# Patient Record
Sex: FEMALE | Race: WHITE | NOT HISPANIC OR LATINO | Employment: OTHER | URBAN - METROPOLITAN AREA
[De-identification: names, ages, dates, MRNs, and addresses within clinical notes are randomized per-mention and may not be internally consistent; named-entity substitution may affect disease eponyms.]

---

## 2023-10-25 ENCOUNTER — OFFICE VISIT (OUTPATIENT)
Dept: ORTHOPEDIC SURGERY | Facility: CLINIC | Age: 73
End: 2023-10-25
Payer: MEDICARE

## 2023-10-25 ENCOUNTER — ANCILLARY PROCEDURE (OUTPATIENT)
Dept: RADIOLOGY | Facility: CLINIC | Age: 73
End: 2023-10-25
Payer: MEDICARE

## 2023-10-25 DIAGNOSIS — M48.061 DEGENERATIVE LUMBAR SPINAL STENOSIS: Primary | ICD-10-CM

## 2023-10-25 DIAGNOSIS — G89.29 CHRONIC LOW BACK PAIN, UNSPECIFIED BACK PAIN LATERALITY, UNSPECIFIED WHETHER SCIATICA PRESENT: ICD-10-CM

## 2023-10-25 DIAGNOSIS — M54.50 CHRONIC LOW BACK PAIN, UNSPECIFIED BACK PAIN LATERALITY, UNSPECIFIED WHETHER SCIATICA PRESENT: ICD-10-CM

## 2023-10-25 PROCEDURE — 99205 OFFICE O/P NEW HI 60 MIN: CPT | Performed by: ORTHOPAEDIC SURGERY

## 2023-10-25 PROCEDURE — 72110 X-RAY EXAM L-2 SPINE 4/>VWS: CPT | Performed by: RADIOLOGY

## 2023-10-25 PROCEDURE — 72110 X-RAY EXAM L-2 SPINE 4/>VWS: CPT

## 2023-10-26 NOTE — PROGRESS NOTES
This 72-year-old woman lives in [facility].  She is self-referred.  She is here with her significant other who has ties to [place].    She has had longstanding issues with her low back.    She has had 2 prior surgeries.  Most recently was a spine fusion at the L3-4 level.    She describes chronic longstanding low back and bilateral buttock pain.  Her ability to walk distances is very compromised.  Her symptoms are consistent with neurogenic claudication.    She has had prior physical therapy.  She has had epidural injections.    She is a diabetic.  She is not certain what her hemoglobin A1c is.    No significant cardiac disease.  She does have thyroid disease.    She does not smoke.    Family, social, and medical histories are obtained and reviewed.    30-point, patient-recorded Review of Systems is personally obtained and reviewed. Inclusive is no history of weight loss, change in appetite, recent change in activity level, change in bowel or bladder habits, fevers, chills, malaise, or night pain.    On exam very pleasant woman no acute distress.  Thin stature.  Healed, long midline lumbar scar.  Flexes to 45 degrees.  Painless motion both hips.    Her strength is intact in the lower extremities without pathologic reflexes.    Plain films are obtained today.  She has pedicle screw instrumentation at L3-4.  There is a healed interbody fusion at L3-4.  She also has a healed interbody fusion at L4-5.  She does have a degenerative L5-S1 disc space.  She has retrolisthesis of L2 on 3 above her prior fusion.    Her MRI, which we uploaded to PACS, does show significant stenosis at L2-3, above her prior fusion.    Impression: We had a very lengthy discussion about her situation today, greater than 50 minutes.    She is symptomatic with lumbar radiculopathy.  The degree of stenosis that she has at L2-3 would warrant discussion of surgery.    Likely, but surgical approach would include a lateral interbody fusion at  L2-3 followed by a revision posterior fusion with instrumentation extending her to L2.    I would make certain that she is exhausted nonsurgical management.  If she has not done physical therapy within the last 6 months, I would strongly recommend a dedicated course of physical therapy to include documentation of the physical therapy that she has done.    Ultimately if she were to return to [place] for surgery, it would be necessary for her to remain here for roughly 4 weeks after surgery before returning home.    We talked at length about the risks and benefits and expectations of surgery.  We talked about the options and alternatives.    I think epidural steroid injections would be of limited benefit, given the severity of her stenosis.    She will consider things and she will let us know if she would like to proceed.    Again, I have stressed the importance of documenting any physical therapy that she may have done in the last 6 months.    ** Dictated with voice recognition software and not immediately reviewed for errors in grammar and/or spelling **

## 2024-04-22 ENCOUNTER — TELEMEDICINE (OUTPATIENT)
Dept: ORTHOPEDIC SURGERY | Facility: CLINIC | Age: 74
End: 2024-04-22
Payer: MEDICARE

## 2024-04-22 DIAGNOSIS — M48.061 DEGENERATIVE LUMBAR SPINAL STENOSIS: Primary | ICD-10-CM

## 2024-04-22 NOTE — PROGRESS NOTES
I had a virtual visit today with Chandni.    She is as noted, she lives in [place].  She and her significant other are originally from [place] and they have a condominium in [place].    She continues to be quite symptomatic with severe back and bilateral leg pain, consistent with neurogenic claudication.    She has had 2 prior surgeries, most recently an L3-4 instrumented fusion.    Imaging reveals adjacent level disease at L2-3 with severe stenosis and retrolisthesis.    She has continued to do regular physical therapy.    She recently had an executive physical that included blood work and she believes a hemoglobin A1c was also obtained.  She is a diabetic.    She would like to proceed with surgery which is an option if her A1c level is appropriate.  She will send these results to me.    Surgery in her case would be a lateral interbody fusion at L4-3 followed by a revision posterior decompression and fusion L2-L3.  This would require removing her prior instrumentation and examining her fusion mass.    We have discussed the risks and benefits and expectations of surgery.      She indicates she would like to consider surgery in July so generally that would mean returning to [place] for preadmission testing roughly 2 weeks in advance and I would suggest staying in town for 4 weeks following surgery before returning home.    ** Dictated with voice recognition software and not immediately reviewed for errors in grammar and/or spelling **

## 2024-06-27 PROBLEM — M48.062 SPINAL STENOSIS, LUMBAR REGION WITH NEUROGENIC CLAUDICATION: Status: ACTIVE | Noted: 2024-06-27

## 2024-07-16 ENCOUNTER — CLINICAL SUPPORT (OUTPATIENT)
Dept: PREADMISSION TESTING | Facility: HOSPITAL | Age: 74
End: 2024-07-16
Payer: MEDICARE

## 2024-07-16 DIAGNOSIS — M48.062 SPINAL STENOSIS, LUMBAR REGION WITH NEUROGENIC CLAUDICATION: ICD-10-CM

## 2024-07-16 RX ORDER — ZOLPIDEM TARTRATE 10 MG/1
TABLET ORAL NIGHTLY PRN
COMMUNITY

## 2024-07-16 RX ORDER — GABAPENTIN 300 MG/1
300 CAPSULE ORAL 2 TIMES DAILY
COMMUNITY

## 2024-07-16 RX ORDER — UBIDECARENONE 30 MG
2 CAPSULE ORAL DAILY
COMMUNITY

## 2024-07-16 RX ORDER — ASPIRIN 81 MG/1
81 TABLET ORAL DAILY
COMMUNITY

## 2024-07-16 RX ORDER — OMEGA-3-ACID ETHYL ESTERS 1 G/1
1 CAPSULE, LIQUID FILLED ORAL DAILY
COMMUNITY

## 2024-07-16 RX ORDER — ACETAMINOPHEN 500 MG
TABLET ORAL DAILY
COMMUNITY

## 2024-07-16 RX ORDER — LAMOTRIGINE 200 MG/1
TABLET ORAL DAILY
COMMUNITY

## 2024-07-16 RX ORDER — ATORVASTATIN CALCIUM 40 MG/1
40 TABLET, FILM COATED ORAL DAILY
COMMUNITY

## 2024-07-16 RX ORDER — LANOLIN ALCOHOL/MO/W.PET/CERES
100 CREAM (GRAM) TOPICAL DAILY
COMMUNITY

## 2024-07-16 RX ORDER — IBUPROFEN 800 MG/1
800 TABLET ORAL EVERY 8 HOURS PRN
COMMUNITY

## 2024-07-16 RX ORDER — IRBESARTAN AND HYDROCHLOROTHIAZIDE 300; 12.5 MG/1; MG/1
1 TABLET, FILM COATED ORAL DAILY
COMMUNITY

## 2024-07-16 RX ORDER — METFORMIN HYDROCHLORIDE 500 MG/1
1000 TABLET ORAL
COMMUNITY

## 2024-07-16 RX ORDER — PROGESTERONE 100 MG/1
50 CAPSULE ORAL DAILY
COMMUNITY

## 2024-07-16 RX ORDER — LEVOTHYROXINE SODIUM 100 UG/1
100 TABLET ORAL DAILY
COMMUNITY

## 2024-07-23 ENCOUNTER — LAB (OUTPATIENT)
Dept: LAB | Facility: LAB | Age: 74
End: 2024-07-23
Payer: MEDICARE

## 2024-07-23 ENCOUNTER — TELEPHONE (OUTPATIENT)
Dept: PREADMISSION TESTING | Facility: HOSPITAL | Age: 74
End: 2024-07-23

## 2024-07-23 ENCOUNTER — PRE-ADMISSION TESTING (OUTPATIENT)
Dept: PREADMISSION TESTING | Facility: HOSPITAL | Age: 74
End: 2024-07-23
Payer: MEDICARE

## 2024-07-23 VITALS
OXYGEN SATURATION: 100 % | SYSTOLIC BLOOD PRESSURE: 153 MMHG | RESPIRATION RATE: 16 BRPM | HEIGHT: 63 IN | WEIGHT: 129.63 LBS | TEMPERATURE: 97.5 F | DIASTOLIC BLOOD PRESSURE: 70 MMHG | BODY MASS INDEX: 22.97 KG/M2 | HEART RATE: 66 BPM

## 2024-07-23 DIAGNOSIS — R06.02 SOB (SHORTNESS OF BREATH): ICD-10-CM

## 2024-07-23 DIAGNOSIS — Z01.818 PRE-OP TESTING: ICD-10-CM

## 2024-07-23 DIAGNOSIS — R73.9 ELEVATED BLOOD SUGAR: ICD-10-CM

## 2024-07-23 DIAGNOSIS — Z01.818 PRE-OP TESTING: Primary | ICD-10-CM

## 2024-07-23 LAB
ALBUMIN SERPL BCP-MCNC: 4.3 G/DL (ref 3.4–5)
ALP SERPL-CCNC: 52 U/L (ref 33–136)
ALT SERPL W P-5'-P-CCNC: 18 U/L (ref 7–45)
ANION GAP SERPL CALC-SCNC: 14 MMOL/L (ref 10–20)
APPEARANCE UR: CLEAR
APTT PPP: 28 SECONDS (ref 27–38)
AST SERPL W P-5'-P-CCNC: 20 U/L (ref 9–39)
ATRIAL RATE: 64 BPM
BASOPHILS # BLD AUTO: 0.04 X10*3/UL (ref 0–0.1)
BASOPHILS NFR BLD AUTO: 0.5 %
BILIRUB SERPL-MCNC: 0.5 MG/DL (ref 0–1.2)
BILIRUB UR STRIP.AUTO-MCNC: NEGATIVE MG/DL
BUN SERPL-MCNC: 23 MG/DL (ref 6–23)
CALCIUM SERPL-MCNC: 9.6 MG/DL (ref 8.6–10.3)
CHLORIDE SERPL-SCNC: 99 MMOL/L (ref 98–107)
CO2 SERPL-SCNC: 28 MMOL/L (ref 21–32)
COLOR UR: YELLOW
CREAT SERPL-MCNC: 0.98 MG/DL (ref 0.5–1.05)
EGFRCR SERPLBLD CKD-EPI 2021: 60 ML/MIN/1.73M*2
EOSINOPHIL # BLD AUTO: 0.15 X10*3/UL (ref 0–0.4)
EOSINOPHIL NFR BLD AUTO: 2 %
ERYTHROCYTE [DISTWIDTH] IN BLOOD BY AUTOMATED COUNT: 12 % (ref 11.5–14.5)
EST. AVERAGE GLUCOSE BLD GHB EST-MCNC: 134 MG/DL
GLUCOSE SERPL-MCNC: 90 MG/DL (ref 74–99)
GLUCOSE UR STRIP.AUTO-MCNC: NORMAL MG/DL
HBA1C MFR BLD: 6.3 %
HCT VFR BLD AUTO: 36 % (ref 36–46)
HGB BLD-MCNC: 11.7 G/DL (ref 12–16)
HOLD SPECIMEN: NORMAL
IMM GRANULOCYTES # BLD AUTO: 0.02 X10*3/UL (ref 0–0.5)
IMM GRANULOCYTES NFR BLD AUTO: 0.3 % (ref 0–0.9)
INR PPP: 1 (ref 0.9–1.1)
KETONES UR STRIP.AUTO-MCNC: NEGATIVE MG/DL
LEUKOCYTE ESTERASE UR QL STRIP.AUTO: NEGATIVE
LYMPHOCYTES # BLD AUTO: 1.29 X10*3/UL (ref 0.8–3)
LYMPHOCYTES NFR BLD AUTO: 17.1 %
MCH RBC QN AUTO: 30.5 PG (ref 26–34)
MCHC RBC AUTO-ENTMCNC: 32.5 G/DL (ref 32–36)
MCV RBC AUTO: 94 FL (ref 80–100)
MONOCYTES # BLD AUTO: 0.53 X10*3/UL (ref 0.05–0.8)
MONOCYTES NFR BLD AUTO: 7 %
NEUTROPHILS # BLD AUTO: 5.53 X10*3/UL (ref 1.6–5.5)
NEUTROPHILS NFR BLD AUTO: 73.1 %
NITRITE UR QL STRIP.AUTO: NEGATIVE
NRBC BLD-RTO: 0 /100 WBCS (ref 0–0)
P AXIS: 12 DEGREES
P OFFSET: 184 MS
P ONSET: 154 MS
PH UR STRIP.AUTO: 5.5 [PH]
PLATELET # BLD AUTO: 328 X10*3/UL (ref 150–450)
POTASSIUM SERPL-SCNC: 3.6 MMOL/L (ref 3.5–5.3)
PR INTERVAL: 152 MS
PROT SERPL-MCNC: 6.7 G/DL (ref 6.4–8.2)
PROT UR STRIP.AUTO-MCNC: NEGATIVE MG/DL
PROTHROMBIN TIME: 11.2 SECONDS (ref 9.8–12.8)
Q ONSET: 230 MS
QRS COUNT: 10 BEATS
QRS DURATION: 146 MS
QT INTERVAL: 466 MS
QTC CALCULATION(BAZETT): 480 MS
QTC FREDERICIA: 476 MS
R AXIS: -51 DEGREES
RBC # BLD AUTO: 3.83 X10*6/UL (ref 4–5.2)
RBC # UR STRIP.AUTO: NEGATIVE /UL
SODIUM SERPL-SCNC: 137 MMOL/L (ref 136–145)
SP GR UR STRIP.AUTO: 1.02
T AXIS: -12 DEGREES
T OFFSET: 463 MS
UROBILINOGEN UR STRIP.AUTO-MCNC: NORMAL MG/DL
VENTRICULAR RATE: 64 BPM
WBC # BLD AUTO: 7.6 X10*3/UL (ref 4.4–11.3)

## 2024-07-23 PROCEDURE — 80053 COMPREHEN METABOLIC PANEL: CPT

## 2024-07-23 PROCEDURE — 85730 THROMBOPLASTIN TIME PARTIAL: CPT

## 2024-07-23 PROCEDURE — 83036 HEMOGLOBIN GLYCOSYLATED A1C: CPT

## 2024-07-23 PROCEDURE — 87081 CULTURE SCREEN ONLY: CPT | Mod: AHULAB | Performed by: ORTHOPAEDIC SURGERY

## 2024-07-23 PROCEDURE — 85610 PROTHROMBIN TIME: CPT

## 2024-07-23 PROCEDURE — 81003 URINALYSIS AUTO W/O SCOPE: CPT

## 2024-07-23 PROCEDURE — 93005 ELECTROCARDIOGRAM TRACING: CPT | Performed by: NURSE PRACTITIONER

## 2024-07-23 PROCEDURE — 85025 COMPLETE CBC W/AUTO DIFF WBC: CPT

## 2024-07-23 PROCEDURE — 36415 COLL VENOUS BLD VENIPUNCTURE: CPT

## 2024-07-23 RX ORDER — TRAMADOL HYDROCHLORIDE 50 MG/1
TABLET ORAL EVERY 6 HOURS PRN
COMMUNITY

## 2024-07-23 RX ORDER — CHLORHEXIDINE GLUCONATE ORAL RINSE 1.2 MG/ML
SOLUTION DENTAL
Qty: 475 ML | Refills: 0 | Status: SHIPPED | OUTPATIENT
Start: 2024-07-23

## 2024-07-23 ASSESSMENT — ENCOUNTER SYMPTOMS
DIARRHEA: 0
NUMBNESS: 1
NECK NEGATIVE: 1
LIMITED RANGE OF MOTION: 1
BRUISES/BLEEDS EASILY: 1
RESPIRATORY NEGATIVE: 1
DYSPNEA WITH EXERTION: 0
CONSTITUTIONAL NEGATIVE: 1
PALPITATIONS: 0
ABDOMINAL PAIN: 0
DYSPNEA AT REST: 0
CONSTIPATION: 0

## 2024-07-23 NOTE — CPM/PAT H&P
[facility] Evaluation       Name: Chandni Crawford (Chandni Crawford)  /Age: 1950/73 y.o.         Date of Consult: 24     Referring Provider: Dr. KRISTIN Evangelista    Surgery, Date, and Length:     L2-L3 Lateral Discectomy and Fusion; L2-L4 Posterior Decompression and Fusion; Removal of Segmental Instrumentation; Exploration of Fusion Mass; Navigation, 24, 240 min       Chandni Crawford is a 73 year-old female who presents to the [facility] for perioperative risk assessment prior to surgery.  Patient currently live in [place]. She and her significant other are originally from [place] and they have a condominium in [place].   She has had 2 prior surgeries, most recently an L3-4 instrumented fusion.     Imaging reveals adjacent level disease at L2-3 with severe stenosis and retrolisthesis.     She has continued to do regular physical therapy.    Patient presents with a primary diagnosis of Spinal stenosis, lumbar region with neurogenic claudication .     This note was created in part upon personal review of patient's medical records.      Patient is scheduled to have a L2-L3 Lateral Discectomy and Fusion; L2-L4 Posterior Decompression and Fusion; Removal of Segmental Instrumentation; Exploration of Fusion Mass; Navigation.      Pt denies any past history of anesthetic complications such as PONV, awareness, prolonged sedation, dental damage, aspiration, cardiac arrest, difficult intubation, difficult I.V. access or unexpected hospital admissions.  NO malignant hyperthermia and or pseudocholinesterase deficiency.  No history of blood transfusions     The patient is not a Protestant and will accept blood and blood products if medically indicated.   Type and screen sent.     Past Medical History:   Diagnosis Date    Buttock pain     bilateral    CAD (coronary artery disease)     followed by cardiologist Dr. Uday saenz in [place], task made for notes, testing and per patient she was cleared from a  "cardiac standpoint    Chronic low back pain     Depression with anxiety     per patient\"thats why I'm on Lamictal\"    DM (diabetes mellitus) (Multi)     no recent A1c, task made for PCP note and recent labs from Dr. Jalil Mckinney in [place]    HLD (hyperlipidemia)     HPV in female     HTN (hypertension)     Hypothyroidism     Neuropathy     per patient constant tingling in both feet; Right leg is painful to touch and tingling    OA (osteoarthritis)     Post-laminectomy syndrome     Ringing in ears, bilateral     constant    Short-term memory loss     Spinal stenosis of lumbar region with neurogenic claudication     Spondylolisthesis        Past Surgical History:   Procedure Laterality Date    BREAST SURGERY      reductionx2    COSMETIC SURGERY      eye lids    DILATION AND CURETTAGE OF UTERUS      GASTRIC BYPASS      HAND SURGERY Bilateral     trigger finger repair, multiple times    HYSTERECTOMY      KNEE SURGERY Right     meniscus repair    LUMBAR LAMINECTOMY      RHINOPLASTY      ROTATOR CUFF REPAIR Bilateral     SPINAL FUSION         Social History     Socioeconomic History    Marital status: Single   Tobacco Use    Smoking status: Never    Smokeless tobacco: Never   Vaping Use    Vaping status: Never Used   Substance and Sexual Activity    Alcohol use: Not Currently    Drug use: Never    Sexual activity: Defer        Family History   Problem Relation Name Age of Onset    Breast cancer Mother      Heart disease Father      Crohn's disease Father      Diabetes Brother         No Known Allergies      Current Outpatient Medications:     5-hydroxytryptophan, 5-HTP, (5-HTP ORAL), Take 100 mg by mouth once daily., Disp: , Rfl:     ascorbic acid/collagen hydr (COLLAGEN SKIN RENEWAL ORAL), Take 1 Dose by mouth 2 times a day., Disp: , Rfl:     aspirin 81 mg EC tablet, Take 1 tablet (81 mg) by mouth once daily., Disp: , Rfl:     atorvastatin (Lipitor) 40 mg tablet, Take 1 tablet (40 mg) by mouth once daily., Disp: , " Rfl:     BIOTIN ORAL, Take 1,000 mg by mouth once daily., Disp: , Rfl:     cholecalciferol (Vitamin D3) 5,000 Units tablet, Take by mouth once daily., Disp: , Rfl:     co-enzyme Q-10 30 mg capsule, Take 2 capsules (60 mg) by mouth once daily., Disp: , Rfl:     dulaglutide (TRULICITY SUBQ), Inject under the skin., Disp: , Rfl:     estradiol cypionate (Depo-estradiol) 5 mg/mL injection, Inject 4 mL (20 mg) into the muscle every 14 (fourteen) days., Disp: , Rfl:     gabapentin (Neurontin) 300 mg capsule, Take 1 capsule (300 mg) by mouth 2 times a day., Disp: , Rfl:     ibuprofen 800 mg tablet, Take 1 tablet (800 mg) by mouth every 8 hours if needed for mild pain (1 - 3)., Disp: , Rfl:     irbesartan-hydrochlorothiazide (Avalide) 300-12.5 mg tablet, Take 1 tablet by mouth once daily., Disp: , Rfl:     IRON GLYCINATE,POLYSACCH CMPLX ORAL, Take by mouth once daily., Disp: , Rfl:     lamoTRIgine (LaMICtal) 200 mg tablet, Take by mouth once daily., Disp: , Rfl:     levothyroxine (Synthroid, Levoxyl) 100 mcg tablet, Take 1 tablet (100 mcg) by mouth early in the morning.. Take on an empty stomach at the same time each day, either 30 to 60 minutes prior to breakfast, Disp: , Rfl:     metFORMIN (Glucophage) 500 mg tablet, Take 2 tablets (1,000 mg) by mouth 2 times daily (morning and late afternoon)., Disp: , Rfl:     omega-3 acid ethyl esters (Lovaza) 1 gram capsule, Take 1 capsule (1 g) by mouth once daily., Disp: , Rfl:     progesterone (Prometrium) 100 mg capsule, Take 50 mg by mouth once daily., Disp: , Rfl:     thiamine 100 mg tablet, Take 1 tablet (100 mg) by mouth once daily., Disp: , Rfl:     traMADol (Ultram) 50 mg tablet, Take by mouth every 6 hours if needed for severe pain (7 - 10)., Disp: , Rfl:     TURMERIC ORAL, Take by mouth once daily., Disp: , Rfl:     zolpidem (Ambien) 10 mg tablet, Take by mouth as needed at bedtime for sleep., Disp: , Rfl:     chlorhexidine (Peridex) 0.12 % solution, Swish for 30 seconds  "and spit 15mL of solution the night before and morning of surgery, Disp: 475 mL, Rfl: 0      PAT ROS:   Constitutional:   neg    Neuro/Psych:    Numbness and tingling down the right leg   numbness  Eyes:    use of corrective lenses  Ears:    no hearing aides  Nose:   Mouth:   neg    Throat:   neg    Neck:   neg    Cardio:    Functional 4 Mets. Patient denies SOB walking up 2 flights of stairs    no chest pain   no palpitations   no dyspnea   no ANN  Respiratory:   neg    Endocrine:   GI:    no abdominal pain   no constipation   no diarrhea  :   neg    Musculoskeletal:    Stabbing, throbbing pain in right hip   decreased ROM  Hematologic:    bruises/bleeds easily   no history of blood transfusion   no blood clots  Skin:  neg        Physical Exam  Physical exam within normal limits.   Musculoskeletal:      Comments: Limited rom due to back pain and leg pain          PAT AIRWAY:   Airway:     Mallampati::  II    Neck ROM::  Full   No broken teeth, no dentures and no missing teeth            Visit Vitals  /70   Pulse 66   Temp 36.4 °C (97.5 °F)   Resp 16   Ht 1.595 m (5' 2.8\")   Wt 58.8 kg (129 lb 10.1 oz)   SpO2 100%   BMI 23.11 kg/m²   Smoking Status Never   BSA 1.61 m²     Plan    Cardiovascular:  Patient denies any chest pain, tightness, heaviness, pressure, radiating pain, palpitations, irregular heartbeats, lightheadedness, cough, congestion, shortness of breath, ANN, PND, near syncope, weight loss or gain.    HLD:  Atorvastatin-Patient to take on dos  Lovaza-patient to hold 7 days prior to surgery    HTN:  Irbesartan-hydrochlorothiazide-patient to hold any evening dose the night before the day of surgery. Patient to hold the medication the day of surgery.    CAD:  Asa 81 mg to be taken on dos    EKG in PAT on 07/23/24  Normal sinus rhythm  Right bundle branch block  Left anterior fascicular block  Bifascicular block   Moderate voltage criteria for LVH, may be normal variant  Abnormal ECG  No previous ECGs " available  Confirmed by Shine Vidal ([id number]) on 7/23/2024 1:16:41 PM      RCRI: 0 Risk of Mace: 3.9%    Received cardiac clearance and last visit note form patient's cardiologist in [place], [place] (Dr. Osullivan). Scanned into chart under encounters tab dated 07/24/24    Pulm:  Denies any shortness of breath or activity intolerance    Stop Bang= 2 (age, htn)    Renal/endo:  Hypothyroidism:  Synthroid-patient to take on dos    DMII-  Glucophage-patient to hold one day prior to surgery  Trulicity-patient took dose 07/22/24, Patient to hold 7 days prior to surgery.     Heme:  Patient instructed to ambulate as soon as possible postoperatively to decrease thromboembolic risk.    Initiate mechanical DVT prophylaxis as soon as possible and initiate chemical prophylaxis when deemed safe from a bleeding standpoint post surgery.    Caprini=6    Risk assessment complete.  Patient is scheduled for a high surgical risk procedure.      Labs/testing obtained in PAT on 07/23/24  CBC, CMP, Coags, UA, MRSA, T&S, HgA1c  Lab Results   Component Value Date    WBC 7.6 07/23/2024    HGB 11.7 (L) 07/23/2024    HCT 36.0 07/23/2024    MCV 94 07/23/2024     07/23/2024     Lab Results   Component Value Date    GLUCOSE 90 07/23/2024    CALCIUM 9.6 07/23/2024     07/23/2024    K 3.6 07/23/2024    CO2 28 07/23/2024    CL 99 07/23/2024    BUN 23 07/23/2024    CREATININE 0.98 07/23/2024      Lab Results   Component Value Date    ALT 18 07/23/2024    AST 20 07/23/2024    ALKPHOS 52 07/23/2024    BILITOT 0.5 07/23/2024      Lab Results   Component Value Date    INR 1.0 07/23/2024    PROTIME 11.2 07/23/2024     Lab Results   Component Value Date    HGBA1C 6.3 (H) 07/23/2024      Labs reviewed unremarkable.    Follow up: UA, MRSA       Preoperative medication instructions were provided and reviewed with the patient.  Any additional testing or evaluation was explained to the patient.  Nothing by mouth instructions were discussed and patient's  questions were answered prior to conclusion to this encounter.  Patient verbalized understanding of preoperative instructions given in preadmission testing; discharge instructions available in EMR.    This note was dictated with speech recognition.  Minor errors may have been detected during use of speech recognition.

## 2024-07-23 NOTE — PREPROCEDURE INSTRUCTIONS
Medication List            Accurate as of July 23, 2024 11:30 AM. Always use your most recent med list.                5-HTP ORAL  Medication Adjustments for Surgery: Stop 7 days before surgery     aspirin 81 mg EC tablet  Medication Adjustments for Surgery: Take morning of surgery with sip of water, no other fluids     atorvastatin 40 mg tablet  Commonly known as: Lipitor  Medication Adjustments for Surgery: Take morning of surgery with sip of water, no other fluids     BIOTIN ORAL  Medication Adjustments for Surgery: Stop 7 days before surgery     chlorhexidine 0.12 % solution  Commonly known as: Peridex  Swish for 30 seconds and spit 15mL of solution the night before and morning of surgery     co-enzyme Q-10 30 mg capsule  Medication Adjustments for Surgery: Stop 7 days before surgery     COLLAGEN SKIN RENEWAL ORAL  Medication Adjustments for Surgery: Stop 7 days before surgery     estradiol cypionate 5 mg/mL injection  Commonly known as: Depo-estradiol  Notes to patient: Hold next dose (due on 08/06/24).     gabapentin 300 mg capsule  Commonly known as: Neurontin  Medication Adjustments for Surgery: Take morning of surgery with sip of water, no other fluids     ibuprofen 800 mg tablet  Medication Adjustments for Surgery: Stop 7 days before surgery     irbesartan-hydrochlorothiazide 300-12.5 mg tablet  Commonly known as: Avalide  Notes to patient: Hold any evening dose the night before the day of surgery. Hold the day of surgery.      IRON GLYCINATE,POLYSACCH CMPLX ORAL  Medication Adjustments for Surgery: Continue until night before surgery     lamoTRIgine 200 mg tablet  Commonly known as: LaMICtal  Medication Adjustments for Surgery: Take morning of surgery with sip of water, no other fluids     levothyroxine 100 mcg tablet  Commonly known as: Synthroid, Levoxyl  Medication Adjustments for Surgery: Take morning of surgery with sip of water, no other fluids     metFORMIN 500 mg tablet  Commonly known as:  Glucophage  Medication Adjustments for Surgery: Stop 1 day before surgery     omega-3 acid ethyl esters 1 gram capsule  Commonly known as: Lovaza  Medication Adjustments for Surgery: Stop 7 days before surgery     progesterone 100 mg capsule  Commonly known as: Prometrium  Medication Adjustments for Surgery: Continue until night before surgery     thiamine 100 mg tablet  Commonly known as: Vitamin B-1  Medication Adjustments for Surgery: Continue until night before surgery     traMADol 50 mg tablet  Commonly known as: Ultram  Medication Adjustments for Surgery: Stop 7 days before surgery     TRULICITY SUBQ  Medication Adjustments for Surgery: Stop 7 days before surgery     TURMERIC ORAL  Medication Adjustments for Surgery: Stop 7 days before surgery     Vitamin D3 5,000 Units tablet  Generic drug: cholecalciferol  Medication Adjustments for Surgery: Continue until night before surgery     zolpidem 10 mg tablet  Commonly known as: Ambien  Notes to patient: May take the night before surgery if needed                        **Concerning above medication instructions, if medication is normally taken at night, continue normal schedule.**  **DO NOT TAKE NIGHT PRIOR AND MORNING OF SURGERY**    CONTACT SURGEON'S OFFICE IF YOU DEVELOP:  * Fever = 100.4 F   * New respiratory symptoms (e.g. cough, shortness of breath, respiratory distress, sore throat)  * Recent loss of taste or smell  *Flu like symptoms such as headache, fatigue or gastrointestinal symptoms  * You develop any open sores, shingles, burning or painful urination   AND/OR:  * You no longer wish to have the surgery.  * Any other personal circumstances change that may lead to the need to cancel or defer this surgery.  *You were admitted to any hospital within one week of your planned procedure.    SMOKING:  *Quitting smoking can make a huge difference to your health and recovery from surgery.    *If you need help with quitting, call 3-800-QUIT-NOW.    THE DAY  BEFORE SURGERY:   Nothing by mouth (no solids or liquids) after midnight.   If diabetic, please check fasting blood sugar upon waking up.    If fasting sugar is <80 mg/dl, please drink 100ml/3oz of apple juice no later than 2 hours prior to arrival time.      SURGICAL TIME  *You will be contacted between 2 p.m. and 6 p.m. the business day before your surgery with your arrival time.  *If you haven't received a call by 6pm, call [phone].  *Scheduled surgery times may change and you will be notified if this occurs-check your personal voicemail for any updates.    ON THE MORNING OF SURGERY:  *Wear comfortable, loose fitting clothing.   *Do not use moisturizers, creams, lotions or perfume.  *All jewelry and valuables should be left at home.  *Prosthetic devices such as contact lenses, hearing aids, dentures, eyelash extensions, hairpins and body piercing must be removed before surgery.    BRING WITH YOU:  *Photo ID and insurance card  *Current list of medicines and allergies  *Pacemaker/Defibrillator/Heart stent cards  *CPAP machine and mask  *Slings/splints/crutches  *Copy of your complete Advanced Directive/DHPOA-if applicable  *Neurostimulator implant remote    PARKING AND ARRIVAL:  *Check in at the Main Entrance desk and let them know you are here for surgery.  *You will be directed to the 2nd floor surgical waiting area.    AFTER OUTPATIENT SURGERY:  *A responsible adult MUST accompany you at the time of discharge and stay with you for 24 hours after your surgery.  *You may NOT drive yourself home after surgery.  *You may use a taxi or ride sharing service ([employer], Uber) to return home ONLY if you are accompanied by a friend or family member.  *Instructions for resuming your medications will be provided by your surgeon.           Patient Information: Oral/Dental Rinse  **This is a prescription; pick it up at your preferred local pharmacy **  What is oral/dental rinse?   It is a mouthwash. It is a way of cleaning  the mouth with a germ killing solution before your surgery.  The solution contains chlorhexidine, commonly known as CHG.   It is used inside the mouth to kill a bacteria known as Staphylococcus aureus.  Let your doctor know if you are allergic to Chlorhexidine.    Why do I need to use CHG oral/dental rinse?  The CHG oral/dental rinse helps to kill a bacteria in your mouth known a Staphylococcus aureus.     This reduces the risk of infection at the surgical site.      Using your CHG oral/dental rinse  STEPS:  Use your CHG oral/dental rinse after you brush your teeth the night before (at bedtime) and the morning of your surgery.  Follow all directions on your prescription label.    Use the cap on the container to measure 15ml (fill cap to fill line)  Swish (gargle if you can) the mouthwash in your mouth for at least 30 seconds, (do not to swallow) spit out  After you use your CHG rinse, do not rinse your mouth with water, drink or eat.  Please refer to prescription label for the appropriate time to resume oral intake  Dental rinse comes in one size bottle: 473ml ~16oz.  You will have leftover    rinse, discard after this use.    What side effects might I have using the CHG oral/dental rinse?  CHG rinse will stick to plaque on the teeth.  Brush and floss just before use.  Teeth brushing will help avoid staining of plaque during use.    Who should I contact if I have questions about the CHG oral/dental rinse?  Please call [facility], Preadmission Testing at [phone] if you have any questions       Home Preoperative Antibacterial Shower     What is a home preoperative antibacterial shower?  This shower is a way of cleaning the skin with a germ killing soap before surgery.  The soap contains chlorhexidine, commonly known as CHG.  CHG is a soap for your skin with germ killing ability.  Let your doctor know if you are allergic to chlorhexidine.    Why do I need to take a preoperative  antibacterial shower?  Skin is not sterile.  It is best to try to make your skin as free of germs as possible before surgery.  Proper cleansing with a germ killing soap before surgery can lower the number of germs on your skin.  This helps to reduce the risk of infection at the surgical site.  Following the instructions listed below will help you prepare your skin for surgery.      How do I use the CHG skin cleanser?  Steps:  Begin using your CHG soap five days before your scheduled surgery on ________________________.    Days 1-4 Shower before bed:  Wash your face and genitals with your normal soap and rinse.    Wash and rinse your hair using the CHG soap. Rinse completely, do not condition your   Hair.          3.    Apply the CHG soap to a clean wet washcloth.  Turn the water off or move away                From the water spray to avoid premature rinsing of the CHG soap as you are applying.     4.   Lather your entire body from the neck down.  Do not use on your face or genitals.   Pay special attention to the area(s) where your incision(s) will be located unless they are on your face.  Avoid scrubbing your skin too hard.  The important point is to have the CHG soap sit on your skin for 3 minutes.    When the 3 minutes are up, turn on the water and rinse the CHG soap off your body completely.   Pat yourself dry with a clean, freshly-laundered towel.  Dress in clean, freshly laundered night clothes.    Be sure to sleep with clean, freshly laundered sheets.  Day 5:  Last shower is the morning before surgery: Follow above Instructions.    NOTE:    *Hair extensions should be removed    *Keep CHG soap out of eyes and ear canals   *DO NOT wash with regular soap on your body after you have used the CHG        soap solution  *DO NOT apply powders, lotions, or perfume.  *Deodorant may be used days 1-4, BUT NOT the day of surgery    Who should I contact if I have any questions regarding the use of CHG soap?  Call the  [facility], Preadmission Testing at [phone] if you have any questions.              Patient Information: Pre-Operative Infection Prevention Measures     Why did I have my nose, under my arms and groin swabbed?  The purpose of the swab is to identify Staphylococcus aureus inside your nose or on your skin.  The swab was sent to the laboratory for culture.  A positive swab/culture for Staphylococcus aureus is called colonization or carriage.      What is Staphylococcus aureus?  Staphylococcus aureus, also known as “staph”, is a germ found on the skin or in the nose of healthy people.  Sometimes Staphylococcus aureus can get into the body and cause an infection.  This can be minor (such as pimples, boils or other skin problems).  It might also be serious (such as blood infection, pneumonia or a surgical site infection).    What is Staphylococcus aureus colonization or carriage?  Colonization or carriage means that a person has the germ but is not sick from it.  These bacteria can be spread on the hands or when breathing or sneezing.    How is Staphylococcus aureus spread?  It is most often spread by close contact with a person or item that carries it.    What happens if my culture is positive for Staphylococcus aureus?  Your doctor/medical team will use this information to guide any antibiotic treatment which may be necessary.  Regardless of the culture results, we will clean the inside of your nose with a betadine swab just before you have your surgery.      Will I get an infection if I have Staphylococcus aureus in my nose or on my skin?  Anyone can get an infection with Staphylococcus aureus.  However, the best way to reduce your risk of infection is to follow the instructions provided to you for the use of your CHG soap and dental rinse.        Who should I contact if I have any questions?  Call the [facility], Preadmission Testing at [phone] if  you have any questions.

## 2024-07-23 NOTE — PREPROCEDURE INSTRUCTIONS
Medication List            Accurate as of July 23, 2024 11:05 AM. Always use your most recent med list.                5-HTP ORAL  Medication Adjustments for Surgery: Stop 7 days before surgery     aspirin 81 mg EC tablet  Medication Adjustments for Surgery: Take morning of surgery with sip of water, no other fluids     atorvastatin 40 mg tablet  Commonly known as: Lipitor  Medication Adjustments for Surgery: Take morning of surgery with sip of water, no other fluids     BIOTIN ORAL  Medication Adjustments for Surgery: Stop 7 days before surgery     chlorhexidine 0.12 % solution  Commonly known as: Peridex  Swish for 30 seconds and spit 15mL of solution the night before and morning of surgery     co-enzyme Q-10 30 mg capsule  Medication Adjustments for Surgery: Stop 7 days before surgery     COLLAGEN SKIN RENEWAL ORAL  Medication Adjustments for Surgery: Stop 7 days before surgery     estradiol cypionate 5 mg/mL injection  Commonly known as: Depo-estradiol  Notes to patient: Hold next dose (due on 08/06/24).     gabapentin 300 mg capsule  Commonly known as: Neurontin  Medication Adjustments for Surgery: Take morning of surgery with sip of water, no other fluids     ibuprofen 800 mg tablet  Medication Adjustments for Surgery: Stop 7 days before surgery     irbesartan-hydrochlorothiazide 300-12.5 mg tablet  Commonly known as: Avalide  Notes to patient: Hold any evening dose the night before the day of surgery. Hold the day of surgery.      IRON GLYCINATE,POLYSACCH CMPLX ORAL  Medication Adjustments for Surgery: Continue until night before surgery     lamoTRIgine 200 mg tablet  Commonly known as: LaMICtal  Medication Adjustments for Surgery: Take morning of surgery with sip of water, no other fluids     levothyroxine 100 mcg tablet  Commonly known as: Synthroid, Levoxyl  Medication Adjustments for Surgery: Take morning of surgery with sip of water, no other fluids     metFORMIN 500 mg tablet  Commonly known as:  Glucophage  Medication Adjustments for Surgery: Stop 1 day before surgery     omega-3 acid ethyl esters 1 gram capsule  Commonly known as: Lovaza  Medication Adjustments for Surgery: Stop 7 days before surgery     progesterone 100 mg capsule  Commonly known as: Prometrium  Medication Adjustments for Surgery: Continue until night before surgery     thiamine 100 mg tablet  Commonly known as: Vitamin B-1  Medication Adjustments for Surgery: Continue until night before surgery     traMADol 50 mg tablet  Commonly known as: Ultram  Medication Adjustments for Surgery: Stop 7 days before surgery     TURMERIC ORAL  Medication Adjustments for Surgery: Stop 7 days before surgery     Vitamin D3 5,000 Units tablet  Generic drug: cholecalciferol  Medication Adjustments for Surgery: Continue until night before surgery     zolpidem 10 mg tablet  Commonly known as: Ambien  Notes to patient: May take the night before surgery if needed                     **Concerning above medication instructions, if medication is normally taken at night, continue normal schedule.**  **DO NOT TAKE NIGHT PRIOR AND MORNING OF SURGERY**    CONTACT SURGEON'S OFFICE IF YOU DEVELOP:  * Fever = 100.4 F   * New respiratory symptoms (e.g. cough, shortness of breath, respiratory distress, sore throat)  * Recent loss of taste or smell  *Flu like symptoms such as headache, fatigue or gastrointestinal symptoms  * You develop any open sores, shingles, burning or painful urination   AND/OR:  * You no longer wish to have the surgery.  * Any other personal circumstances change that may lead to the need to cancel or defer this surgery.  *You were admitted to any hospital within one week of your planned procedure.    SMOKING:  *Quitting smoking can make a huge difference to your health and recovery from surgery.    *If you need help with quitting, call 2-800-QUIT-NOW.    THE DAY BEFORE SURGERY:   Nothing by mouth (no solids or liquids) after midnight.   If diabetic,  please check fasting blood sugar upon waking up.    If fasting sugar is <80 mg/dl, please drink 100ml/3oz of apple juice no later than 2 hours prior to arrival time.      SURGICAL TIME  *You will be contacted between 2 p.m. and 6 p.m. the business day before your surgery with your arrival time.  *If you haven't received a call by 6pm, call [phone].  *Scheduled surgery times may change and you will be notified if this occurs-check your personal voicemail for any updates.    ON THE MORNING OF SURGERY:  *Wear comfortable, loose fitting clothing.   *Do not use moisturizers, creams, lotions or perfume.  *All jewelry and valuables should be left at home.  *Prosthetic devices such as contact lenses, hearing aids, dentures, eyelash extensions, hairpins and body piercing must be removed before surgery.    BRING WITH YOU:  *Photo ID and insurance card  *Current list of medicines and allergies  *Pacemaker/Defibrillator/Heart stent cards  *CPAP machine and mask  *Slings/splints/crutches  *Copy of your complete Advanced Directive/DHPOA-if applicable  *Neurostimulator implant remote    PARKING AND ARRIVAL:  *Check in at the Main Entrance desk and let them know you are here for surgery.  *You will be directed to the 2nd floor surgical waiting area.    AFTER OUTPATIENT SURGERY:  *A responsible adult MUST accompany you at the time of discharge and stay with you for 24 hours after your surgery.  *You may NOT drive yourself home after surgery.  *You may use a taxi or ride sharing service ([employer], Uber) to return home ONLY if you are accompanied by a friend or family member.  *Instructions for resuming your medications will be provided by your surgeon.         Patient Information: Oral/Dental Rinse  **This is a prescription; pick it up at your preferred local pharmacy **  What is oral/dental rinse?   It is a mouthwash. It is a way of cleaning the mouth with a germ killing solution before your surgery.  The solution contains  chlorhexidine, commonly known as CHG.   It is used inside the mouth to kill a bacteria known as Staphylococcus aureus.  Let your doctor know if you are allergic to Chlorhexidine.    Why do I need to use CHG oral/dental rinse?  The CHG oral/dental rinse helps to kill a bacteria in your mouth known a Staphylococcus aureus.     This reduces the risk of infection at the surgical site.      Using your CHG oral/dental rinse  STEPS:  Use your CHG oral/dental rinse after you brush your teeth the night before (at bedtime) and the morning of your surgery.  Follow all directions on your prescription label.    Use the cap on the container to measure 15ml (fill cap to fill line)  Swish (gargle if you can) the mouthwash in your mouth for at least 30 seconds, (do not to swallow) spit out  After you use your CHG rinse, do not rinse your mouth with water, drink or eat.  Please refer to prescription label for the appropriate time to resume oral intake  Dental rinse comes in one size bottle: 473ml ~16oz.  You will have leftover    rinse, discard after this use.    What side effects might I have using the CHG oral/dental rinse?  CHG rinse will stick to plaque on the teeth.  Brush and floss just before use.  Teeth brushing will help avoid staining of plaque during use.    Who should I contact if I have questions about the CHG oral/dental rinse?  Please call [facility], Preadmission Testing at [phone] if you have any questions    Home Preoperative Antibacterial Shower     What is a home preoperative antibacterial shower?  This shower is a way of cleaning the skin with a germ killing soap before surgery.  The soap contains chlorhexidine, commonly known as CHG.  CHG is a soap for your skin with germ killing ability.  Let your doctor know if you are allergic to chlorhexidine.    Why do I need to take a preoperative antibacterial shower?  Skin is not sterile.  It is best to try to make your skin as free of  germs as possible before surgery.  Proper cleansing with a germ killing soap before surgery can lower the number of germs on your skin.  This helps to reduce the risk of infection at the surgical site.  Following the instructions listed below will help you prepare your skin for surgery.      How do I use the CHG skin cleanser?  Steps:  Begin using your CHG soap five days before your scheduled surgery on ________________________.    Days 1-4 Shower before bed:  Wash your face and genitals with your normal soap and rinse.    Wash and rinse your hair using the CHG soap. Rinse completely, do not condition your   Hair.          3.    Apply the CHG soap to a clean wet washcloth.  Turn the water off or move away                From the water spray to avoid premature rinsing of the CHG soap as you are applying.     4.   Lather your entire body from the neck down.  Do not use on your face or genitals.   Pay special attention to the area(s) where your incision(s) will be located unless they are on your face.  Avoid scrubbing your skin too hard.  The important point is to have the CHG soap sit on your skin for 3 minutes.    When the 3 minutes are up, turn on the water and rinse the CHG soap off your body completely.   Pat yourself dry with a clean, freshly-laundered towel.  Dress in clean, freshly laundered night clothes.    Be sure to sleep with clean, freshly laundered sheets.  Day 5:  Last shower is the morning before surgery: Follow above Instructions.    NOTE:    *Hair extensions should be removed    *Keep CHG soap out of eyes and ear canals   *DO NOT wash with regular soap on your body after you have used the CHG        soap solution  *DO NOT apply powders, lotions, or perfume.  *Deodorant may be used days 1-4, BUT NOT the day of surgery    Who should I contact if I have any questions regarding the use of CHG soap?  Call the [facility], Preadmission Testing at [phone] if you have any  questions.              Patient Information: Pre-Operative Infection Prevention Measures     Why did I have my nose, under my arms and groin swabbed?  The purpose of the swab is to identify Staphylococcus aureus inside your nose or on your skin.  The swab was sent to the laboratory for culture.  A positive swab/culture for Staphylococcus aureus is called colonization or carriage.      What is Staphylococcus aureus?  Staphylococcus aureus, also known as “staph”, is a germ found on the skin or in the nose of healthy people.  Sometimes Staphylococcus aureus can get into the body and cause an infection.  This can be minor (such as pimples, boils or other skin problems).  It might also be serious (such as blood infection, pneumonia or a surgical site infection).    What is Staphylococcus aureus colonization or carriage?  Colonization or carriage means that a person has the germ but is not sick from it.  These bacteria can be spread on the hands or when breathing or sneezing.    How is Staphylococcus aureus spread?  It is most often spread by close contact with a person or item that carries it.    What happens if my culture is positive for Staphylococcus aureus?  Your doctor/medical team will use this information to guide any antibiotic treatment which may be necessary.  Regardless of the culture results, we will clean the inside of your nose with a betadine swab just before you have your surgery.      Will I get an infection if I have Staphylococcus aureus in my nose or on my skin?  Anyone can get an infection with Staphylococcus aureus.  However, the best way to reduce your risk of infection is to follow the instructions provided to you for the use of your CHG soap and dental rinse.        Who should I contact if I have any questions?  Call the [facility], Preadmission Testing at [phone] if you have any questions.

## 2024-07-25 ENCOUNTER — LAB (OUTPATIENT)
Dept: LAB | Facility: LAB | Age: 74
End: 2024-07-25
Payer: MEDICARE

## 2024-07-25 DIAGNOSIS — Z01.818 PRE-OP TESTING: ICD-10-CM

## 2024-07-25 LAB
ABO GROUP (TYPE) IN BLOOD: NORMAL
ANTIBODY SCREEN: NORMAL
RH FACTOR (ANTIGEN D): NORMAL
STAPHYLOCOCCUS SPEC CULT: NORMAL

## 2024-07-25 PROCEDURE — 86850 RBC ANTIBODY SCREEN: CPT

## 2024-07-25 PROCEDURE — 86901 BLOOD TYPING SEROLOGIC RH(D): CPT

## 2024-07-25 PROCEDURE — 36415 COLL VENOUS BLD VENIPUNCTURE: CPT

## 2024-07-25 PROCEDURE — 86900 BLOOD TYPING SEROLOGIC ABO: CPT

## 2024-08-02 ENCOUNTER — TELEPHONE (OUTPATIENT)
Dept: PREADMISSION TESTING | Facility: HOSPITAL | Age: 74
End: 2024-08-02
Payer: MEDICARE

## 2024-08-05 ENCOUNTER — ANESTHESIA EVENT (OUTPATIENT)
Dept: OPERATING ROOM | Facility: HOSPITAL | Age: 74
End: 2024-08-05
Payer: MEDICARE

## 2024-08-05 ENCOUNTER — LAB (OUTPATIENT)
Dept: LAB | Facility: LAB | Age: 74
End: 2024-08-05
Payer: MEDICARE

## 2024-08-05 DIAGNOSIS — Z01.818 PRE-OP TESTING: ICD-10-CM

## 2024-08-05 LAB
ABO GROUP (TYPE) IN BLOOD: NORMAL
ANTIBODY SCREEN: NORMAL
RH FACTOR (ANTIGEN D): NORMAL

## 2024-08-05 PROCEDURE — 86900 BLOOD TYPING SEROLOGIC ABO: CPT

## 2024-08-05 PROCEDURE — 36415 COLL VENOUS BLD VENIPUNCTURE: CPT

## 2024-08-05 PROCEDURE — 86901 BLOOD TYPING SEROLOGIC RH(D): CPT

## 2024-08-05 PROCEDURE — 86850 RBC ANTIBODY SCREEN: CPT

## 2024-08-05 NOTE — ANESTHESIA PREPROCEDURE EVALUATION
"Patient: Chandni Crawford    Procedure Information       Date/Time: 08/06/24 1130    Procedure: L2-L3 Lateral Discectomy and Fusion; L2-L4 Posterior Decompression and Fusion; Removal of Segmental Instrumentation; Exploration of Fusion Mass; Navigation (Spine Lumbar)    Location: [facility] A OR 07 / Virtual [facility] A OR    Surgeons: Guillaume Evangelista MD                                                         Pre- Anesthesia Evaluation                                            Chandni Crawford is a 73 y.o. female who presents for lumbar spine surgery.     Past Medical History:   Diagnosis Date    CAD (coronary artery disease)     followed by cardiologist Dr. Uday saenz in [place], task made for notes, testing and per patient she was cleared from a cardiac standpoint    Chronic low back pain     Depression with anxiety     per patient\"thats why I'm on Lamictal\"    DM (diabetes mellitus) (Multi)     no recent A1c, task made for PCP note and recent labs from Dr. Jalil Mckinney in [place]    HLD (hyperlipidemia)     HTN (hypertension)     Hypothyroidism     Neuropathy     per patient constant tingling in both feet; Right leg is painful to touch and tingling    OA (osteoarthritis)     Post-laminectomy syndrome     Ringing in ears, bilateral     constant    Short-term memory loss     Spinal stenosis of lumbar region with neurogenic claudication     Spondylolisthesis      Past Surgical History:   Procedure Laterality Date    BREAST SURGERY      reductionx2    COSMETIC SURGERY      eye lids    DILATION AND CURETTAGE OF UTERUS      GASTRIC BYPASS      HAND SURGERY Bilateral     trigger finger repair, multiple times    HYSTERECTOMY      KNEE SURGERY Right     meniscus repair    LUMBAR LAMINECTOMY      RHINOPLASTY      ROTATOR CUFF REPAIR Bilateral     SPINAL FUSION       Social History   She reports that she has never smoked. She has never used smokeless tobacco. She reports that she does not currently use alcohol. She reports " that she does not use drugs.    Allergies and Medications   No Known Allergies  Current Outpatient Medications   Medication Instructions    5-hydroxytryptophan, 5-HTP, (5-HTP ORAL) 100 mg, oral, Daily    ascorbic acid/collagen hydr (COLLAGEN SKIN RENEWAL ORAL) 1 Dose, oral, 2 times daily    aspirin 81 mg, oral, Daily    atorvastatin (LIPITOR) 40 mg, oral, Daily    BIOTIN ORAL 1,000 mg, oral, Daily    chlorhexidine (Peridex) 0.12 % solution Swish for 30 seconds and spit 15mL of solution the night before and morning of surgery    cholecalciferol (Vitamin D3) 5,000 Units tablet oral, Daily    co-enzyme Q-10 30 mg capsule 2 capsules, oral, Daily    dulaglutide (TRULICITY SUBQ) subcutaneous    estradiol cypionate (DEPO-ESTRADIOL) 20 mg, intramuscular, Every 14 days    gabapentin (NEURONTIN) 300 mg, oral, 2 times daily    ibuprofen 800 mg, oral, Every 8 hours PRN    irbesartan-hydrochlorothiazide (Avalide) 300-12.5 mg tablet 1 tablet, oral, Daily    IRON GLYCINATE,POLYSACCH CMPLX ORAL oral, Daily    lamoTRIgine (LaMICtal) 200 mg tablet oral, Daily    levothyroxine (SYNTHROID, LEVOXYL) 100 mcg, oral, Daily, Take on an empty stomach at the same time each day, either 30 to 60 minutes prior to breakfast    metFORMIN (GLUCOPHAGE) 1,000 mg, oral, 2 times daily (morning and late afternoon)    omega-3 acid ethyl esters (LOVAZA) 1 g, oral, Daily    progesterone (PROMETRIUM) 50 mg, oral, Daily    thiamine (VITAMIN B-1) 100 mg, oral, Daily    traMADol (Ultram) 50 mg tablet oral, Every 6 hours PRN    TURMERIC ORAL oral, Daily    zolpidem (Ambien) 10 mg tablet oral, Nightly PRN   Trulicity held for one week,    Recent Labs     07/23/24  1157   WBC 7.6   HGB 11.7*   HCT 36.0      MCV 94     Recent Labs     08/05/24  1020 07/25/24  1325 07/23/24  1157   PROTIME  --   --  11.2   INR  --   --  1.0   APTT  --   --  28   ABO A   < >  --     < > = values in this interval not displayed.     Recent Labs     07/23/24  1157   EGFR 60*  "  ANIONGAP 14   BUN 23   CREATININE 0.98      K 3.6   CL 99   CO2 28   GLUCOSE 90     Recent Labs     07/23/24  1157   PROT 6.7   ALBUMIN 4.3   BILITOT 0.5   ALKPHOS 52   ALT 18   AST 20     Recent Labs     07/23/24  1157   HGBA1C 6.3*   CALCIUM 9.6       Lab Results   Component Value Date    STAPHMRSASCR No Staphylococcus aureus isolated 07/23/2024       EKG 7/23/24    Normal sinus rhythm  Right bundle branch block  Left anterior fascicular block  Bifascicular block   Moderate voltage criteria for LVH, may be normal variant  Abnormal ECG  No previous ECGs available  Confirmed by Shine Vidal ([id number]) on 7/23/2024 1:16:41 PM      Visit Vitals  /68   Pulse 73   Temp 36.3 °C (97.3 °F) (Temporal)   Resp 16   Ht 1.6 m (5' 3\")   Wt 59.1 kg (130 lb 4.7 oz)   SpO2 97%   BMI 23.08 kg/m²   Smoking Status Never   BSA 1.62 m²     Medical Gas Therapy: None (Room air)         Relevant Problems   Cardiac   (+) CAD (coronary artery disease) (Increased calcium score.)   (+) HLD (hyperlipidemia)   (+) HTN (hypertension)      Neuro   (+) Depression with anxiety   (+) Short-term memory loss      Endocrine   (+) Hypothyroidism      Musculoskeletal   (+) Spinal stenosis, lumbar region with neurogenic claudication      Nervous   (+) Chronic pain   (+) Neuropathy       Clinical information reviewed:   Tobacco  Allergies    Med Hx  Surg Hx   Fam Hx  Soc Hx        NPO Detail:  NPO/Void Status  Carbohydrate Drink Given Prior to Surgery? : N  Date of Last Liquid: 08/06/24  Time of Last Liquid: 0800  Date of Last Solid: 08/05/24  Time of Last Solid: 2300  Last Intake Type: Clear fluids  Time of Last Void: 0830         Physical Exam    Airway  Mallampati: II  TM distance: >3 FB  Neck ROM: full     Cardiovascular   Rhythm: regular  Rate: normal     Dental - normal exam       Pulmonary   Comments: Normal RR  Non-labored respiration    Abdominal      Other findings: Apprehensive  Eyelash extensions.          Anesthesia " Plan    History of general anesthesia?: yes  History of complications of general anesthesia?: no    ASA 3     general   (General with ETT. Standard ASA monitoring)  The patient is not a current smoker.    intravenous induction   Postoperative administration of opioids is intended.  Anesthetic plan and risks discussed with patient.  Use of blood products discussed with patient who consented to blood products.    Plan discussed with CAA and CRNA.    Discussed increased risk of corneal abrasion and damage to eyelash extensions.

## 2024-08-06 ENCOUNTER — APPOINTMENT (OUTPATIENT)
Dept: RADIOLOGY | Facility: HOSPITAL | Age: 74
End: 2024-08-06
Payer: MEDICARE

## 2024-08-06 ENCOUNTER — HOSPITAL ENCOUNTER (INPATIENT)
Facility: HOSPITAL | Age: 74
LOS: 3 days | Discharge: HOME | End: 2024-08-09
Attending: ORTHOPAEDIC SURGERY | Admitting: ORTHOPAEDIC SURGERY
Payer: MEDICARE

## 2024-08-06 ENCOUNTER — ANESTHESIA (OUTPATIENT)
Dept: OPERATING ROOM | Facility: HOSPITAL | Age: 74
End: 2024-08-06
Payer: MEDICARE

## 2024-08-06 DIAGNOSIS — M48.062 SPINAL STENOSIS, LUMBAR REGION WITH NEUROGENIC CLAUDICATION: Primary | ICD-10-CM

## 2024-08-06 PROBLEM — G89.29 CHRONIC PAIN: Status: ACTIVE | Noted: 2024-08-06

## 2024-08-06 LAB
GLUCOSE BLD MANUAL STRIP-MCNC: 105 MG/DL (ref 74–99)
GLUCOSE BLD MANUAL STRIP-MCNC: 134 MG/DL (ref 74–99)

## 2024-08-06 PROCEDURE — 22558 ARTHRD ANT NTRBD MIN DSC LUM: CPT | Performed by: ORTHOPAEDIC SURGERY

## 2024-08-06 PROCEDURE — 3700000002 HC GENERAL ANESTHESIA TIME - EACH INCREMENTAL 1 MINUTE: Performed by: ORTHOPAEDIC SURGERY

## 2024-08-06 PROCEDURE — 2500000005 HC RX 250 GENERAL PHARMACY W/O HCPCS: Performed by: NURSE ANESTHETIST, CERTIFIED REGISTERED

## 2024-08-06 PROCEDURE — 22612 ARTHRD PST TQ 1NTRSPC LUMBAR: CPT | Performed by: PHYSICIAN ASSISTANT

## 2024-08-06 PROCEDURE — 2500000004 HC RX 250 GENERAL PHARMACY W/ HCPCS (ALT 636 FOR OP/ED): Performed by: ANESTHESIOLOGY

## 2024-08-06 PROCEDURE — 20985 CPTR-ASST DIR MS PX: CPT | Performed by: ORTHOPAEDIC SURGERY

## 2024-08-06 PROCEDURE — 2500000004 HC RX 250 GENERAL PHARMACY W/ HCPCS (ALT 636 FOR OP/ED): Mod: JZ | Performed by: PHYSICIAN ASSISTANT

## 2024-08-06 PROCEDURE — 0QP004Z REMOVAL OF INTERNAL FIXATION DEVICE FROM LUMBAR VERTEBRA, OPEN APPROACH: ICD-10-PCS | Performed by: ORTHOPAEDIC SURGERY

## 2024-08-06 PROCEDURE — 22842 INSERT SPINE FIXATION DEVICE: CPT | Performed by: ORTHOPAEDIC SURGERY

## 2024-08-06 PROCEDURE — A22558 PR ARTHRODESIS ANT INTERBODY MIN DISCECTOMY,LUMBAR: Performed by: ANESTHESIOLOGY

## 2024-08-06 PROCEDURE — 1100000001 HC PRIVATE ROOM DAILY

## 2024-08-06 PROCEDURE — 76000 FLUOROSCOPY <1 HR PHYS/QHP: CPT

## 2024-08-06 PROCEDURE — 0SG00AJ FUSION OF LUMBAR VERTEBRAL JOINT WITH INTERBODY FUSION DEVICE, POSTERIOR APPROACH, ANTERIOR COLUMN, OPEN APPROACH: ICD-10-PCS | Performed by: ORTHOPAEDIC SURGERY

## 2024-08-06 PROCEDURE — 36415 COLL VENOUS BLD VENIPUNCTURE: CPT | Performed by: ORTHOPAEDIC SURGERY

## 2024-08-06 PROCEDURE — 7100000001 HC RECOVERY ROOM TIME - INITIAL BASE CHARGE: Performed by: ORTHOPAEDIC SURGERY

## 2024-08-06 PROCEDURE — 3600000003 HC OR TIME - INITIAL BASE CHARGE - PROCEDURE LEVEL THREE: Performed by: ORTHOPAEDIC SURGERY

## 2024-08-06 PROCEDURE — 22853 INSJ BIOMECHANICAL DEVICE: CPT | Performed by: ORTHOPAEDIC SURGERY

## 2024-08-06 PROCEDURE — 2500000002 HC RX 250 W HCPCS SELF ADMINISTERED DRUGS (ALT 637 FOR MEDICARE OP, ALT 636 FOR OP/ED): Performed by: PHYSICIAN ASSISTANT

## 2024-08-06 PROCEDURE — 22612 ARTHRD PST TQ 1NTRSPC LUMBAR: CPT | Performed by: ORTHOPAEDIC SURGERY

## 2024-08-06 PROCEDURE — C1713 ANCHOR/SCREW BN/BN,TIS/BN: HCPCS | Performed by: ORTHOPAEDIC SURGERY

## 2024-08-06 PROCEDURE — P9045 ALBUMIN (HUMAN), 5%, 250 ML: HCPCS | Mod: JZ | Performed by: ANESTHESIOLOGIST ASSISTANT

## 2024-08-06 PROCEDURE — A22558 PR ARTHRODESIS ANT INTERBODY MIN DISCECTOMY,LUMBAR: Performed by: ANESTHESIOLOGIST ASSISTANT

## 2024-08-06 PROCEDURE — 82947 ASSAY GLUCOSE BLOOD QUANT: CPT

## 2024-08-06 PROCEDURE — 3600000008 HC OR TIME - EACH INCREMENTAL 1 MINUTE - PROCEDURE LEVEL THREE: Performed by: ORTHOPAEDIC SURGERY

## 2024-08-06 PROCEDURE — 63047 LAM FACETEC & FORAMOT LUMBAR: CPT | Performed by: PHYSICIAN ASSISTANT

## 2024-08-06 PROCEDURE — 2500000004 HC RX 250 GENERAL PHARMACY W/ HCPCS (ALT 636 FOR OP/ED): Performed by: ANESTHESIOLOGIST ASSISTANT

## 2024-08-06 PROCEDURE — 2500000001 HC RX 250 WO HCPCS SELF ADMINISTERED DRUGS (ALT 637 FOR MEDICARE OP): Performed by: PHYSICIAN ASSISTANT

## 2024-08-06 PROCEDURE — 3700000001 HC GENERAL ANESTHESIA TIME - INITIAL BASE CHARGE: Performed by: ORTHOPAEDIC SURGERY

## 2024-08-06 PROCEDURE — 2780000003 HC OR 278 NO HCPCS: Performed by: ORTHOPAEDIC SURGERY

## 2024-08-06 PROCEDURE — 0SG00K1 FUSION OF LUMBAR VERTEBRAL JOINT WITH NONAUTOLOGOUS TISSUE SUBSTITUTE, POSTERIOR APPROACH, POSTERIOR COLUMN, OPEN APPROACH: ICD-10-PCS | Performed by: ORTHOPAEDIC SURGERY

## 2024-08-06 PROCEDURE — 2500000005 HC RX 250 GENERAL PHARMACY W/O HCPCS: Performed by: ANESTHESIOLOGY

## 2024-08-06 PROCEDURE — 00NY0ZZ RELEASE LUMBAR SPINAL CORD, OPEN APPROACH: ICD-10-PCS | Performed by: ORTHOPAEDIC SURGERY

## 2024-08-06 PROCEDURE — 2500000004 HC RX 250 GENERAL PHARMACY W/ HCPCS (ALT 636 FOR OP/ED): Performed by: NURSE ANESTHETIST, CERTIFIED REGISTERED

## 2024-08-06 PROCEDURE — 63047 LAM FACETEC & FORAMOT LUMBAR: CPT | Performed by: ORTHOPAEDIC SURGERY

## 2024-08-06 PROCEDURE — 8E0WXBZ COMPUTER ASSISTED PROCEDURE OF TRUNK REGION: ICD-10-PCS | Performed by: ORTHOPAEDIC SURGERY

## 2024-08-06 PROCEDURE — 22853 INSJ BIOMECHANICAL DEVICE: CPT | Performed by: PHYSICIAN ASSISTANT

## 2024-08-06 PROCEDURE — 0ST20ZZ RESECTION OF LUMBAR VERTEBRAL DISC, OPEN APPROACH: ICD-10-PCS | Performed by: ORTHOPAEDIC SURGERY

## 2024-08-06 PROCEDURE — 2720000007 HC OR 272 NO HCPCS: Performed by: ORTHOPAEDIC SURGERY

## 2024-08-06 PROCEDURE — C1821 INTERSPINOUS IMPLANT: HCPCS | Performed by: ORTHOPAEDIC SURGERY

## 2024-08-06 PROCEDURE — 7100000002 HC RECOVERY ROOM TIME - EACH INCREMENTAL 1 MINUTE: Performed by: ORTHOPAEDIC SURGERY

## 2024-08-06 PROCEDURE — 22842 INSERT SPINE FIXATION DEVICE: CPT | Performed by: PHYSICIAN ASSISTANT

## 2024-08-06 PROCEDURE — 22558 ARTHRD ANT NTRBD MIN DSC LUM: CPT | Performed by: PHYSICIAN ASSISTANT

## 2024-08-06 PROCEDURE — 2500000001 HC RX 250 WO HCPCS SELF ADMINISTERED DRUGS (ALT 637 FOR MEDICARE OP): Performed by: ANESTHESIOLOGY

## 2024-08-06 PROCEDURE — 2500000004 HC RX 250 GENERAL PHARMACY W/ HCPCS (ALT 636 FOR OP/ED): Performed by: ORTHOPAEDIC SURGERY

## 2024-08-06 PROCEDURE — 2500000005 HC RX 250 GENERAL PHARMACY W/O HCPCS: Performed by: ANESTHESIOLOGIST ASSISTANT

## 2024-08-06 PROCEDURE — 99100 ANES PT EXTEME AGE<1 YR&>70: CPT | Performed by: ANESTHESIOLOGY

## 2024-08-06 DEVICE — IMPLANTABLE DEVICE: Type: IMPLANTABLE DEVICE | Site: SPINE LUMBAR | Status: FUNCTIONAL

## 2024-08-06 DEVICE — BONE CHIPS,  CANCELL 30CC 1.7-10MM: Type: IMPLANTABLE DEVICE | Site: SPINE LUMBAR | Status: FUNCTIONAL

## 2024-08-06 DEVICE — ROD, 5.5 X 60 CVD TI SOLERA: Type: IMPLANTABLE DEVICE | Site: SPINE LUMBAR | Status: FUNCTIONAL

## 2024-08-06 DEVICE — ROD, 5.5 X 70 CVD TI SOLERA: Type: IMPLANTABLE DEVICE | Site: SPINE LUMBAR | Status: FUNCTIONAL

## 2024-08-06 DEVICE — SET SCREW, 5.5, TI NS BRK OFF: Type: IMPLANTABLE DEVICE | Site: SPINE LUMBAR | Status: FUNCTIONAL

## 2024-08-06 DEVICE — SCREW, SOLERA 5.5 MAS, 7.5X45: Type: IMPLANTABLE DEVICE | Site: SPINE LUMBAR | Status: FUNCTIONAL

## 2024-08-06 DEVICE — SCREW, MAS 5.5 X 45 CC SOLERA: Type: IMPLANTABLE DEVICE | Site: SPINE LUMBAR | Status: FUNCTIONAL

## 2024-08-06 DEVICE — BONE, PUTTY DBX  5CC DE-MINERAL ASEPTIC: Type: IMPLANTABLE DEVICE | Site: SPINE LUMBAR | Status: FUNCTIONAL

## 2024-08-06 RX ORDER — HYDROMORPHONE HCL/0.9% NACL/PF 15 MG/30ML
PATIENT CONTROLLED ANALGESIA SYRINGE INTRAVENOUS CONTINUOUS
Status: DISCONTINUED | OUTPATIENT
Start: 2024-08-06 | End: 2024-08-06 | Stop reason: SDUPTHER

## 2024-08-06 RX ORDER — LAMOTRIGINE 100 MG/1
200 TABLET ORAL DAILY
Status: DISCONTINUED | OUTPATIENT
Start: 2024-08-06 | End: 2024-08-09 | Stop reason: HOSPADM

## 2024-08-06 RX ORDER — METHOCARBAMOL 500 MG/1
500 TABLET, FILM COATED ORAL 4 TIMES DAILY
Status: DISCONTINUED | OUTPATIENT
Start: 2024-08-06 | End: 2024-08-09 | Stop reason: HOSPADM

## 2024-08-06 RX ORDER — OXYCODONE HYDROCHLORIDE 5 MG/1
5 TABLET ORAL EVERY 4 HOURS PRN
Status: DISCONTINUED | OUTPATIENT
Start: 2024-08-06 | End: 2024-08-06 | Stop reason: HOSPADM

## 2024-08-06 RX ORDER — ONDANSETRON HYDROCHLORIDE 2 MG/ML
4 INJECTION, SOLUTION INTRAVENOUS EVERY 8 HOURS PRN
Status: DISCONTINUED | OUTPATIENT
Start: 2024-08-06 | End: 2024-08-06 | Stop reason: SDUPTHER

## 2024-08-06 RX ORDER — NALOXONE HYDROCHLORIDE 0.4 MG/ML
0.2 INJECTION, SOLUTION INTRAMUSCULAR; INTRAVENOUS; SUBCUTANEOUS AS NEEDED
Status: DISCONTINUED | OUTPATIENT
Start: 2024-08-06 | End: 2024-08-09 | Stop reason: HOSPADM

## 2024-08-06 RX ORDER — DROPERIDOL 2.5 MG/ML
0.62 INJECTION, SOLUTION INTRAMUSCULAR; INTRAVENOUS ONCE AS NEEDED
Status: DISCONTINUED | OUTPATIENT
Start: 2024-08-06 | End: 2024-08-06 | Stop reason: HOSPADM

## 2024-08-06 RX ORDER — OXYCODONE HYDROCHLORIDE 5 MG/1
10 TABLET ORAL EVERY 4 HOURS PRN
Status: DISCONTINUED | OUTPATIENT
Start: 2024-08-06 | End: 2024-08-09 | Stop reason: HOSPADM

## 2024-08-06 RX ORDER — CEFAZOLIN SODIUM 2 G/100ML
2 INJECTION, SOLUTION INTRAVENOUS EVERY 8 HOURS
Status: COMPLETED | OUTPATIENT
Start: 2024-08-06 | End: 2024-08-08

## 2024-08-06 RX ORDER — OXYCODONE HYDROCHLORIDE 5 MG/1
2.5 TABLET ORAL EVERY 4 HOURS PRN
Status: DISCONTINUED | OUTPATIENT
Start: 2024-08-06 | End: 2024-08-09 | Stop reason: HOSPADM

## 2024-08-06 RX ORDER — KETAMINE HYDROCHLORIDE 50 MG/ML
INJECTION, SOLUTION INTRAMUSCULAR; INTRAVENOUS AS NEEDED
Status: DISCONTINUED | OUTPATIENT
Start: 2024-08-06 | End: 2024-08-06

## 2024-08-06 RX ORDER — ZOLPIDEM TARTRATE 5 MG/1
10 TABLET ORAL NIGHTLY PRN
Status: DISCONTINUED | OUTPATIENT
Start: 2024-08-06 | End: 2024-08-09 | Stop reason: HOSPADM

## 2024-08-06 RX ORDER — ONDANSETRON HYDROCHLORIDE 2 MG/ML
INJECTION, SOLUTION INTRAVENOUS AS NEEDED
Status: DISCONTINUED | OUTPATIENT
Start: 2024-08-06 | End: 2024-08-06

## 2024-08-06 RX ORDER — HYDROMORPHONE HYDROCHLORIDE 1 MG/ML
INJECTION, SOLUTION INTRAMUSCULAR; INTRAVENOUS; SUBCUTANEOUS AS NEEDED
Status: DISCONTINUED | OUTPATIENT
Start: 2024-08-06 | End: 2024-08-06

## 2024-08-06 RX ORDER — DEXMEDETOMIDINE IN 0.9 % NACL 20 MCG/5ML
SYRINGE (ML) INTRAVENOUS AS NEEDED
Status: DISCONTINUED | OUTPATIENT
Start: 2024-08-06 | End: 2024-08-06

## 2024-08-06 RX ORDER — SODIUM CHLORIDE, SODIUM LACTATE, POTASSIUM CHLORIDE, CALCIUM CHLORIDE 600; 310; 30; 20 MG/100ML; MG/100ML; MG/100ML; MG/100ML
100 INJECTION, SOLUTION INTRAVENOUS CONTINUOUS
Status: DISCONTINUED | OUTPATIENT
Start: 2024-08-06 | End: 2024-08-06 | Stop reason: HOSPADM

## 2024-08-06 RX ORDER — DIPHENHYDRAMINE HYDROCHLORIDE 50 MG/ML
12.5 INJECTION INTRAMUSCULAR; INTRAVENOUS EVERY 6 HOURS PRN
Status: DISCONTINUED | OUTPATIENT
Start: 2024-08-06 | End: 2024-08-09 | Stop reason: HOSPADM

## 2024-08-06 RX ORDER — CEFAZOLIN 1 G/1
INJECTION, POWDER, FOR SOLUTION INTRAVENOUS AS NEEDED
Status: DISCONTINUED | OUTPATIENT
Start: 2024-08-06 | End: 2024-08-06

## 2024-08-06 RX ORDER — METFORMIN HYDROCHLORIDE 500 MG/1
1000 TABLET ORAL
Status: DISCONTINUED | OUTPATIENT
Start: 2024-08-06 | End: 2024-08-09 | Stop reason: HOSPADM

## 2024-08-06 RX ORDER — MAGNESIUM SULFATE HEPTAHYDRATE 40 MG/ML
2 INJECTION, SOLUTION INTRAVENOUS ONCE
Status: COMPLETED | OUTPATIENT
Start: 2024-08-06 | End: 2024-08-06

## 2024-08-06 RX ORDER — ONDANSETRON 4 MG/1
4 TABLET, FILM COATED ORAL EVERY 8 HOURS PRN
Status: DISCONTINUED | OUTPATIENT
Start: 2024-08-06 | End: 2024-08-06 | Stop reason: SDUPTHER

## 2024-08-06 RX ORDER — HYDROMORPHONE HYDROCHLORIDE 0.2 MG/ML
0.2 INJECTION INTRAMUSCULAR; INTRAVENOUS; SUBCUTANEOUS EVERY 4 HOURS PRN
Status: DISCONTINUED | OUTPATIENT
Start: 2024-08-06 | End: 2024-08-09 | Stop reason: HOSPADM

## 2024-08-06 RX ORDER — OXYCODONE HYDROCHLORIDE 5 MG/1
5 TABLET ORAL EVERY 4 HOURS PRN
Status: DISCONTINUED | OUTPATIENT
Start: 2024-08-06 | End: 2024-08-09 | Stop reason: HOSPADM

## 2024-08-06 RX ORDER — PROGESTERONE 100 MG/1
100 CAPSULE ORAL DAILY
Status: DISCONTINUED | OUTPATIENT
Start: 2024-08-07 | End: 2024-08-09 | Stop reason: HOSPADM

## 2024-08-06 RX ORDER — NALOXONE HYDROCHLORIDE 0.4 MG/ML
0.2 INJECTION, SOLUTION INTRAMUSCULAR; INTRAVENOUS; SUBCUTANEOUS AS NEEDED
Status: DISCONTINUED | OUTPATIENT
Start: 2024-08-06 | End: 2024-08-06 | Stop reason: SDUPTHER

## 2024-08-06 RX ORDER — FENTANYL CITRATE 50 UG/ML
INJECTION, SOLUTION INTRAMUSCULAR; INTRAVENOUS AS NEEDED
Status: DISCONTINUED | OUTPATIENT
Start: 2024-08-06 | End: 2024-08-06

## 2024-08-06 RX ORDER — DEXTROSE 50 % IN WATER (D50W) INTRAVENOUS SYRINGE
25
Status: DISCONTINUED | OUTPATIENT
Start: 2024-08-06 | End: 2024-08-09 | Stop reason: HOSPADM

## 2024-08-06 RX ORDER — LEVOTHYROXINE SODIUM 100 UG/1
100 TABLET ORAL DAILY
Status: DISCONTINUED | OUTPATIENT
Start: 2024-08-06 | End: 2024-08-09 | Stop reason: HOSPADM

## 2024-08-06 RX ORDER — SODIUM CHLORIDE, SODIUM LACTATE, POTASSIUM CHLORIDE, CALCIUM CHLORIDE 600; 310; 30; 20 MG/100ML; MG/100ML; MG/100ML; MG/100ML
100 INJECTION, SOLUTION INTRAVENOUS CONTINUOUS
Status: DISCONTINUED | OUTPATIENT
Start: 2024-08-06 | End: 2024-08-09 | Stop reason: HOSPADM

## 2024-08-06 RX ORDER — HYDROMORPHONE HCL/0.9% NACL/PF 15 MG/30ML
PATIENT CONTROLLED ANALGESIA SYRINGE INTRAVENOUS CONTINUOUS
Status: DISCONTINUED | OUTPATIENT
Start: 2024-08-06 | End: 2024-08-09 | Stop reason: HOSPADM

## 2024-08-06 RX ORDER — GLYCOPYRROLATE 0.2 MG/ML
INJECTION INTRAMUSCULAR; INTRAVENOUS AS NEEDED
Status: DISCONTINUED | OUTPATIENT
Start: 2024-08-06 | End: 2024-08-06

## 2024-08-06 RX ORDER — GABAPENTIN 300 MG/1
300 CAPSULE ORAL 2 TIMES DAILY
Status: DISCONTINUED | OUTPATIENT
Start: 2024-08-06 | End: 2024-08-07 | Stop reason: ALTCHOICE

## 2024-08-06 RX ORDER — ONDANSETRON HYDROCHLORIDE 2 MG/ML
4 INJECTION, SOLUTION INTRAVENOUS EVERY 8 HOURS PRN
Status: DISCONTINUED | OUTPATIENT
Start: 2024-08-06 | End: 2024-08-09 | Stop reason: HOSPADM

## 2024-08-06 RX ORDER — METHOCARBAMOL 100 MG/ML
INJECTION, SOLUTION INTRAMUSCULAR; INTRAVENOUS AS NEEDED
Status: DISCONTINUED | OUTPATIENT
Start: 2024-08-06 | End: 2024-08-06

## 2024-08-06 RX ORDER — ATORVASTATIN CALCIUM 40 MG/1
40 TABLET, FILM COATED ORAL NIGHTLY
Status: DISCONTINUED | OUTPATIENT
Start: 2024-08-06 | End: 2024-08-09 | Stop reason: HOSPADM

## 2024-08-06 RX ORDER — PROPOFOL 10 MG/ML
INJECTION, EMULSION INTRAVENOUS AS NEEDED
Status: DISCONTINUED | OUTPATIENT
Start: 2024-08-06 | End: 2024-08-06

## 2024-08-06 RX ORDER — NALOXONE HYDROCHLORIDE 1 MG/ML
0.2 INJECTION INTRAMUSCULAR; INTRAVENOUS; SUBCUTANEOUS EVERY 5 MIN PRN
Status: DISCONTINUED | OUTPATIENT
Start: 2024-08-06 | End: 2024-08-09 | Stop reason: HOSPADM

## 2024-08-06 RX ORDER — ACETAMINOPHEN 325 MG/1
650 TABLET ORAL EVERY 6 HOURS
Status: DISCONTINUED | OUTPATIENT
Start: 2024-08-06 | End: 2024-08-09 | Stop reason: HOSPADM

## 2024-08-06 RX ORDER — PROCHLORPERAZINE EDISYLATE 5 MG/ML
5 INJECTION INTRAMUSCULAR; INTRAVENOUS ONCE AS NEEDED
Status: DISCONTINUED | OUTPATIENT
Start: 2024-08-06 | End: 2024-08-06 | Stop reason: HOSPADM

## 2024-08-06 RX ORDER — HYDRALAZINE HYDROCHLORIDE 20 MG/ML
5 INJECTION INTRAMUSCULAR; INTRAVENOUS EVERY 30 MIN PRN
Status: DISCONTINUED | OUTPATIENT
Start: 2024-08-06 | End: 2024-08-06 | Stop reason: HOSPADM

## 2024-08-06 RX ORDER — ONDANSETRON HYDROCHLORIDE 2 MG/ML
4 INJECTION, SOLUTION INTRAVENOUS ONCE AS NEEDED
Status: DISCONTINUED | OUTPATIENT
Start: 2024-08-06 | End: 2024-08-06 | Stop reason: HOSPADM

## 2024-08-06 RX ORDER — LIDOCAINE HYDROCHLORIDE 20 MG/ML
INJECTION, SOLUTION EPIDURAL; INFILTRATION; INTRACAUDAL; PERINEURAL AS NEEDED
Status: DISCONTINUED | OUTPATIENT
Start: 2024-08-06 | End: 2024-08-06

## 2024-08-06 RX ORDER — DEXAMETHASONE SODIUM PHOSPHATE 10 MG/ML
10 INJECTION INTRAMUSCULAR; INTRAVENOUS EVERY 8 HOURS SCHEDULED
Status: CANCELLED | OUTPATIENT
Start: 2024-08-06 | End: 2024-08-07

## 2024-08-06 RX ORDER — ROCURONIUM BROMIDE 10 MG/ML
INJECTION, SOLUTION INTRAVENOUS AS NEEDED
Status: DISCONTINUED | OUTPATIENT
Start: 2024-08-06 | End: 2024-08-06

## 2024-08-06 RX ORDER — DOCUSATE SODIUM 100 MG/1
100 CAPSULE, LIQUID FILLED ORAL 2 TIMES DAILY
Status: DISCONTINUED | OUTPATIENT
Start: 2024-08-06 | End: 2024-08-09 | Stop reason: HOSPADM

## 2024-08-06 RX ORDER — ONDANSETRON 4 MG/1
4 TABLET, ORALLY DISINTEGRATING ORAL EVERY 8 HOURS PRN
Status: DISCONTINUED | OUTPATIENT
Start: 2024-08-06 | End: 2024-08-09 | Stop reason: HOSPADM

## 2024-08-06 RX ORDER — LANOLIN ALCOHOL/MO/W.PET/CERES
100 CREAM (GRAM) TOPICAL DAILY
Status: DISCONTINUED | OUTPATIENT
Start: 2024-08-06 | End: 2024-08-09 | Stop reason: HOSPADM

## 2024-08-06 RX ORDER — ALBUMIN HUMAN 50 G/1000ML
SOLUTION INTRAVENOUS AS NEEDED
Status: DISCONTINUED | OUTPATIENT
Start: 2024-08-06 | End: 2024-08-06

## 2024-08-06 RX ORDER — ASPIRIN 81 MG/1
81 TABLET ORAL DAILY
Status: DISCONTINUED | OUTPATIENT
Start: 2024-08-06 | End: 2024-08-09 | Stop reason: HOSPADM

## 2024-08-06 RX ORDER — ENOXAPARIN SODIUM 100 MG/ML
40 INJECTION SUBCUTANEOUS EVERY 24 HOURS
Status: CANCELLED | OUTPATIENT
Start: 2024-08-06

## 2024-08-06 RX ORDER — DEXTROSE 50 % IN WATER (D50W) INTRAVENOUS SYRINGE
12.5
Status: DISCONTINUED | OUTPATIENT
Start: 2024-08-06 | End: 2024-08-09 | Stop reason: HOSPADM

## 2024-08-06 SDOH — HEALTH STABILITY: MENTAL HEALTH: CURRENT SMOKER: 0

## 2024-08-06 ASSESSMENT — COGNITIVE AND FUNCTIONAL STATUS - GENERAL
TURNING FROM BACK TO SIDE WHILE IN FLAT BAD: A LOT
DAILY ACTIVITIY SCORE: 20
DRESSING REGULAR LOWER BODY CLOTHING: A LITTLE
STANDING UP FROM CHAIR USING ARMS: A LOT
TOILETING: A LITTLE
HELP NEEDED FOR BATHING: A LITTLE
PATIENT BASELINE BEDBOUND: NO
DRESSING REGULAR UPPER BODY CLOTHING: A LITTLE
WALKING IN HOSPITAL ROOM: A LITTLE
MOBILITY SCORE: 13
MOVING TO AND FROM BED TO CHAIR: A LOT
CLIMB 3 TO 5 STEPS WITH RAILING: A LOT
MOVING FROM LYING ON BACK TO SITTING ON SIDE OF FLAT BED WITH BEDRAILS: A LOT

## 2024-08-06 ASSESSMENT — PAIN - FUNCTIONAL ASSESSMENT

## 2024-08-06 ASSESSMENT — PAIN SCALES - GENERAL
PAINLEVEL_OUTOF10: 10 - WORST POSSIBLE PAIN
PAINLEVEL_OUTOF10: 8
PAINLEVEL_OUTOF10: 5 - MODERATE PAIN
PAINLEVEL_OUTOF10: 8
PAINLEVEL_OUTOF10: 5 - MODERATE PAIN
PAINLEVEL_OUTOF10: 8
PAINLEVEL_OUTOF10: 9
PAIN_LEVEL: 0
PAINLEVEL_OUTOF10: 5 - MODERATE PAIN
PAINLEVEL_OUTOF10: 9
PAINLEVEL_OUTOF10: 8
PAINLEVEL_OUTOF10: 6
PAINLEVEL_OUTOF10: 10 - WORST POSSIBLE PAIN
PAINLEVEL_OUTOF10: 10 - WORST POSSIBLE PAIN
PAINLEVEL_OUTOF10: 5 - MODERATE PAIN
PAINLEVEL_OUTOF10: 5 - MODERATE PAIN

## 2024-08-06 ASSESSMENT — COLUMBIA-SUICIDE SEVERITY RATING SCALE - C-SSRS
6. HAVE YOU EVER DONE ANYTHING, STARTED TO DO ANYTHING, OR PREPARED TO DO ANYTHING TO END YOUR LIFE?: NO
2. HAVE YOU ACTUALLY HAD ANY THOUGHTS OF KILLING YOURSELF?: NO
1. IN THE PAST MONTH, HAVE YOU WISHED YOU WERE DEAD OR WISHED YOU COULD GO TO SLEEP AND NOT WAKE UP?: NO

## 2024-08-06 ASSESSMENT — PAIN DESCRIPTION - LOCATION: LOCATION: BACK

## 2024-08-06 ASSESSMENT — ACTIVITIES OF DAILY LIVING (ADL): LACK_OF_TRANSPORTATION: NO

## 2024-08-06 NOTE — ANESTHESIA POSTPROCEDURE EVALUATION
Patient: Chandni Crawford    Procedure Summary       Date: 08/06/24 Room / Location: [facility] OR 07 / [facility] OR    Anesthesia Start: 1344 Anesthesia Stop: 1755    Procedure: L2-L3 Lateral Discectomy and Fusion; L2-L4 Posterior Decompression and Fusion; Removal of Segmental Instrumentation; Exploration of Fusion Mass; Navigation (Spine Lumbar) Diagnosis:       Spinal stenosis, lumbar region with neurogenic claudication      (Spinal stenosis, lumbar region with neurogenic claudication [M48.062])    Surgeons: Guillaume Evangelista MD Responsible Provider: Stanley Padron MD    Anesthesia Type: general ASA Status: 3            Anesthesia Type: general    Vitals Value Taken Time   /74 08/06/24 1845   Temp 36.2 °C (97.2 °F) 08/06/24 1845   Pulse 65 08/06/24 1845   Resp 14 08/06/24 1845   SpO2 99 % 08/06/24 1845       Anesthesia Post Evaluation    Patient location during evaluation: bedside  Patient participation: complete - patient cannot participate  Level of consciousness: awake  Pain score: 0  Pain management: adequate  Airway patency: patent  Cardiovascular status: acceptable  Respiratory status: acceptable  Hydration status: acceptable  Postoperative Nausea and Vomiting: none        No notable events documented.

## 2024-08-06 NOTE — H&P
"History Of Present Illness  Chandni Crawford is a 73 y.o. female presenting with severe lumbar radiculopathy. Plan lateral and posterior decompression and fusion.     Past Medical History  She has a past medical history of CAD (coronary artery disease), Chronic low back pain, Depression with anxiety, DM (diabetes mellitus) (Multi), HLD (hyperlipidemia), HTN (hypertension), Hypothyroidism, Neuropathy, OA (osteoarthritis), Post-laminectomy syndrome, Ringing in ears, bilateral, Short-term memory loss, Spinal stenosis of lumbar region with neurogenic claudication, and Spondylolisthesis.    Surgical History  She has a past surgical history that includes Dilation and curettage of uterus; Hand surgery (Bilateral); Rotator cuff repair (Bilateral); Gastric bypass; Spinal fusion; Lumbar laminectomy; Knee surgery (Right); Breast surgery; Rhinoplasty; Cosmetic surgery; and Hysterectomy.     Social History  She reports that she has never smoked. She has never used smokeless tobacco. She reports that she does not currently use alcohol. She reports that she does not use drugs.    Family History  Family History   Problem Relation Name Age of Onset    Breast cancer Mother      Heart disease Father      Crohn's disease Father      Diabetes Brother          Allergies  Patient has no known allergies.    Review of Systems     Physical Exam     Last Recorded Vitals  Blood pressure 156/68, pulse 73, temperature 36.3 °C (97.3 °F), temperature source Temporal, resp. rate 16, height 1.6 m (5' 3\"), weight 59.1 kg (130 lb 4.7 oz), SpO2 97%.    Relevant Results      Scheduled medications     Continuous medications  lactated Ringer's, 100 mL/hr, Last Rate: 100 mL/hr (08/06/24 1043)      PRN medications  PRN medications: dextrose, dextrose, glucagon, glucagon  Results for orders placed or performed during the hospital encounter of 08/06/24 (from the past 24 hour(s))   POCT GLUCOSE   Result Value Ref Range    POCT Glucose 105 (H) 74 - 99 mg/dL "       Assessment/Plan   Principal Problem:    Spinal stenosis, lumbar region with neurogenic claudication  Active Problems:    CAD (coronary artery disease)    HTN (hypertension)    Hypothyroidism    DM (diabetes mellitus) (Multi)    Depression with anxiety    HLD (hyperlipidemia)    Neuropathy    Short-term memory loss    Buttock pain    Chronic pain              minutes in the professional and overall care of this patient.      Guillaume Evangelista MD

## 2024-08-06 NOTE — ANESTHESIA PROCEDURE NOTES
Peripheral IV  Date/Time: 8/6/2024 2:02 PM      Placement  Needle size: 18 G  Laterality: left  Location: hand  Local anesthetic: none  Site prep: alcohol  Technique: anatomical landmarks  Attempts: 1

## 2024-08-06 NOTE — ANESTHESIA PROCEDURE NOTES
Airway  Date/Time: 8/6/2024 1:59 PM  Urgency: elective    Airway not difficult    Staffing  Performed: CRNA   Authorized by: Cindy Carrillo MD    Performed by: AMRIT King-NATALIE  Patient location during procedure: OR    Indications and Patient Condition  Indications for airway management: anesthesia  Spontaneous Ventilation: absent  Sedation level: deep  Preoxygenated: yes  Patient position: sniffing  Mask difficulty assessment: 1 - vent by mask    Final Airway Details  Final airway type: endotracheal airway      Successful airway: ETT  Cuffed: yes   Successful intubation technique: direct laryngoscopy  Facilitating devices/methods: cricoid pressure  Endotracheal tube insertion site: oral  Blade: Tyler  Blade size: #3  ETT size (mm): 7.0  Cormack-Lehane Classification: grade III - view of epiglottis only  Placement verified by: capnometry   Measured from: lips  Number of attempts at approach: 1

## 2024-08-06 NOTE — DISCHARGE INSTRUCTIONS
Limit your activity to primarily just walking.  Avoid any excessive bending, twisting and lifting no more than 10 pounds.  Please avoid any anti-inflammatories such as Advil, Aleve, ibuprofen, Motrin for the next 4 weeks.  Please do not take any vitamins until your follow-up appointment with Dr. Evangelista.      You may shower Monday, August 12th. Until then, you can sponge bath. Please keep your incision as dry and clean as possible.     We will write for your pain medication up to 6 weeks postop.   These will be sent electronically for you.  Please call Louise when you need a refill. Please call 24 hours in advance of your last pill running out.    You  may change your surgical dressing in 48 hours following discharge.  Please keep the incision dry and clean.  The Steri-Strips will fall off on their own.  You may change your dressing daily or as needed.  Once there is no more discharge from the incisions than you may keep it open to air.      Please call our office, physician assistant, oLuise, at [phone] when you need a refill.  Please do not hesitate to reach out to Dr. Evangelista or Louise with any postop questions.

## 2024-08-07 LAB
ALBUMIN SERPL BCP-MCNC: 3.2 G/DL (ref 3.4–5)
ALP SERPL-CCNC: 44 U/L (ref 33–136)
ALT SERPL W P-5'-P-CCNC: 15 U/L (ref 7–45)
ANION GAP SERPL CALC-SCNC: 12 MMOL/L (ref 10–20)
AST SERPL W P-5'-P-CCNC: 22 U/L (ref 9–39)
BILIRUB SERPL-MCNC: 0.4 MG/DL (ref 0–1.2)
BUN SERPL-MCNC: 14 MG/DL (ref 6–23)
CALCIUM SERPL-MCNC: 8 MG/DL (ref 8.6–10.3)
CHLORIDE SERPL-SCNC: 99 MMOL/L (ref 98–107)
CO2 SERPL-SCNC: 28 MMOL/L (ref 21–32)
CREAT SERPL-MCNC: 0.82 MG/DL (ref 0.5–1.05)
EGFRCR SERPLBLD CKD-EPI 2021: 75 ML/MIN/1.73M*2
ERYTHROCYTE [DISTWIDTH] IN BLOOD BY AUTOMATED COUNT: 12.2 % (ref 11.5–14.5)
GLUCOSE BLD MANUAL STRIP-MCNC: 129 MG/DL (ref 74–99)
GLUCOSE BLD MANUAL STRIP-MCNC: 222 MG/DL (ref 74–99)
GLUCOSE SERPL-MCNC: 144 MG/DL (ref 74–99)
HCT VFR BLD AUTO: 25.5 % (ref 36–46)
HGB BLD-MCNC: 8.5 G/DL (ref 12–16)
MCH RBC QN AUTO: 31.6 PG (ref 26–34)
MCHC RBC AUTO-ENTMCNC: 33.3 G/DL (ref 32–36)
MCV RBC AUTO: 95 FL (ref 80–100)
NRBC BLD-RTO: 0 /100 WBCS (ref 0–0)
PLATELET # BLD AUTO: 261 X10*3/UL (ref 150–450)
POTASSIUM SERPL-SCNC: 3.8 MMOL/L (ref 3.5–5.3)
PROT SERPL-MCNC: 5.2 G/DL (ref 6.4–8.2)
RBC # BLD AUTO: 2.69 X10*6/UL (ref 4–5.2)
SODIUM SERPL-SCNC: 135 MMOL/L (ref 136–145)
WBC # BLD AUTO: 16.2 X10*3/UL (ref 4.4–11.3)

## 2024-08-07 PROCEDURE — 80053 COMPREHEN METABOLIC PANEL: CPT | Performed by: PHYSICIAN ASSISTANT

## 2024-08-07 PROCEDURE — 36415 COLL VENOUS BLD VENIPUNCTURE: CPT | Performed by: PHYSICIAN ASSISTANT

## 2024-08-07 PROCEDURE — 85027 COMPLETE CBC AUTOMATED: CPT | Performed by: PHYSICIAN ASSISTANT

## 2024-08-07 PROCEDURE — 97165 OT EVAL LOW COMPLEX 30 MIN: CPT | Mod: GO

## 2024-08-07 PROCEDURE — 2500000004 HC RX 250 GENERAL PHARMACY W/ HCPCS (ALT 636 FOR OP/ED): Performed by: ORTHOPAEDIC SURGERY

## 2024-08-07 PROCEDURE — 82947 ASSAY GLUCOSE BLOOD QUANT: CPT

## 2024-08-07 PROCEDURE — 1100000001 HC PRIVATE ROOM DAILY

## 2024-08-07 PROCEDURE — 97535 SELF CARE MNGMENT TRAINING: CPT | Mod: GO

## 2024-08-07 PROCEDURE — 2500000002 HC RX 250 W HCPCS SELF ADMINISTERED DRUGS (ALT 637 FOR MEDICARE OP, ALT 636 FOR OP/ED): Performed by: PHYSICIAN ASSISTANT

## 2024-08-07 PROCEDURE — 2500000004 HC RX 250 GENERAL PHARMACY W/ HCPCS (ALT 636 FOR OP/ED): Performed by: PHYSICIAN ASSISTANT

## 2024-08-07 PROCEDURE — 9420000001 HC RT PATIENT EDUCATION 5 MIN

## 2024-08-07 PROCEDURE — 97161 PT EVAL LOW COMPLEX 20 MIN: CPT | Mod: GP

## 2024-08-07 PROCEDURE — 2500000001 HC RX 250 WO HCPCS SELF ADMINISTERED DRUGS (ALT 637 FOR MEDICARE OP): Performed by: PHYSICIAN ASSISTANT

## 2024-08-07 RX ORDER — GABAPENTIN 300 MG/1
600 CAPSULE ORAL 3 TIMES DAILY
Status: DISCONTINUED | OUTPATIENT
Start: 2024-08-07 | End: 2024-08-09 | Stop reason: HOSPADM

## 2024-08-07 SDOH — SOCIAL STABILITY: SOCIAL INSECURITY: HAVE YOU HAD ANY THOUGHTS OF HARMING ANYONE ELSE?: NO

## 2024-08-07 SDOH — SOCIAL STABILITY: SOCIAL INSECURITY: HAVE YOU HAD THOUGHTS OF HARMING ANYONE ELSE?: NO

## 2024-08-07 SDOH — SOCIAL STABILITY: SOCIAL INSECURITY: DO YOU FEEL ANYONE HAS EXPLOITED OR TAKEN ADVANTAGE OF YOU FINANCIALLY OR OF YOUR PERSONAL PROPERTY?: NO

## 2024-08-07 SDOH — SOCIAL STABILITY: SOCIAL INSECURITY: WERE YOU ABLE TO COMPLETE ALL THE BEHAVIORAL HEALTH SCREENINGS?: YES

## 2024-08-07 SDOH — SOCIAL STABILITY: SOCIAL INSECURITY: ARE THERE ANY APPARENT SIGNS OF INJURIES/BEHAVIORS THAT COULD BE RELATED TO ABUSE/NEGLECT?: NO

## 2024-08-07 SDOH — SOCIAL STABILITY: SOCIAL INSECURITY: HAS ANYONE EVER THREATENED TO HURT YOUR FAMILY OR YOUR PETS?: NO

## 2024-08-07 SDOH — SOCIAL STABILITY: SOCIAL INSECURITY: DOES ANYONE TRY TO KEEP YOU FROM HAVING/CONTACTING OTHER FRIENDS OR DOING THINGS OUTSIDE YOUR HOME?: NO

## 2024-08-07 SDOH — SOCIAL STABILITY: SOCIAL INSECURITY: ABUSE: ADULT

## 2024-08-07 SDOH — SOCIAL STABILITY: SOCIAL INSECURITY: ARE YOU OR HAVE YOU BEEN THREATENED OR ABUSED PHYSICALLY, EMOTIONALLY, OR SEXUALLY BY ANYONE?: NO

## 2024-08-07 SDOH — SOCIAL STABILITY: SOCIAL INSECURITY: DO YOU FEEL UNSAFE GOING BACK TO THE PLACE WHERE YOU ARE LIVING?: NO

## 2024-08-07 ASSESSMENT — PATIENT HEALTH QUESTIONNAIRE - PHQ9
2. FEELING DOWN, DEPRESSED OR HOPELESS: NOT AT ALL
SUM OF ALL RESPONSES TO PHQ9 QUESTIONS 1 & 2: 0
1. LITTLE INTEREST OR PLEASURE IN DOING THINGS: NOT AT ALL

## 2024-08-07 ASSESSMENT — PAIN - FUNCTIONAL ASSESSMENT
PAIN_FUNCTIONAL_ASSESSMENT: 0-10

## 2024-08-07 ASSESSMENT — RESPIRATORY DISTRESS OBSERVATION SCALE (RDOS)
INVOLUNTARY NASAL FLARING: 0 - NONE
RESPIRATORY RATE PER MINUTE: 0 - <19 BREATHS
RDOS TOTAL SCORE: 0
RESTLESS NONPURPOSEFUL MOVEMENTS: 0 - NONE
LOOK OF FEAR: 0 - NONE
ACCESSORY MUSCLE RISE IN CLAVICLE DURING INSPIRATION: 0 - NONE
PARADOXICAL BREATHING PATTERN: 0 - NONE
HEART RATE PER MINUTE: 0 - <90 BEATS
GRUNTING AT END OF EXPIRATION: 0 - NONE

## 2024-08-07 ASSESSMENT — COGNITIVE AND FUNCTIONAL STATUS - GENERAL
MOVING FROM LYING ON BACK TO SITTING ON SIDE OF FLAT BED WITH BEDRAILS: A LITTLE
PATIENT BASELINE BEDBOUND: NO
MOVING TO AND FROM BED TO CHAIR: A LITTLE
DRESSING REGULAR LOWER BODY CLOTHING: A LOT
DRESSING REGULAR LOWER BODY CLOTHING: A LITTLE
PERSONAL GROOMING: A LITTLE
DAILY ACTIVITIY SCORE: 20
TURNING FROM BACK TO SIDE WHILE IN FLAT BAD: A LITTLE
EATING MEALS: A LITTLE
HELP NEEDED FOR BATHING: A LITTLE
CLIMB 3 TO 5 STEPS WITH RAILING: A LOT
WALKING IN HOSPITAL ROOM: A LITTLE
MOVING TO AND FROM BED TO CHAIR: A LITTLE
CLIMB 3 TO 5 STEPS WITH RAILING: A LITTLE
MOVING FROM LYING ON BACK TO SITTING ON SIDE OF FLAT BED WITH BEDRAILS: A LITTLE
TURNING FROM BACK TO SIDE WHILE IN FLAT BAD: A LITTLE
DRESSING REGULAR UPPER BODY CLOTHING: A LITTLE
MOVING TO AND FROM BED TO CHAIR: A LITTLE
STANDING UP FROM CHAIR USING ARMS: A LITTLE
TOILETING: A LITTLE
PERSONAL GROOMING: A LITTLE
MOBILITY SCORE: 17
STANDING UP FROM CHAIR USING ARMS: A LITTLE
HELP NEEDED FOR BATHING: A LITTLE
TURNING FROM BACK TO SIDE WHILE IN FLAT BAD: A LITTLE
WALKING IN HOSPITAL ROOM: A LITTLE
DAILY ACTIVITIY SCORE: 18
MOVING FROM LYING ON BACK TO SITTING ON SIDE OF FLAT BED WITH BEDRAILS: A LITTLE
CLIMB 3 TO 5 STEPS WITH RAILING: A LITTLE
TOILETING: A LITTLE
MOBILITY SCORE: 18
MOBILITY SCORE: 18
WALKING IN HOSPITAL ROOM: A LITTLE
HELP NEEDED FOR BATHING: A LITTLE
DRESSING REGULAR UPPER BODY CLOTHING: A LITTLE
DRESSING REGULAR LOWER BODY CLOTHING: A LITTLE
STANDING UP FROM CHAIR USING ARMS: A LITTLE
TOILETING: A LITTLE
DRESSING REGULAR UPPER BODY CLOTHING: A LITTLE
DAILY ACTIVITIY SCORE: 18

## 2024-08-07 ASSESSMENT — PAIN SCALES - GENERAL
PAINLEVEL_OUTOF10: 8
PAINLEVEL_OUTOF10: 8
PAINLEVEL_OUTOF10: 0 - NO PAIN

## 2024-08-07 ASSESSMENT — ACTIVITIES OF DAILY LIVING (ADL)
HEARING - RIGHT EAR: FUNCTIONAL
GROOMING: INDEPENDENT
WALKS IN HOME: NEEDS ASSISTANCE
ASSISTIVE_DEVICE: WALKER
TOILETING: NEEDS ASSISTANCE
PATIENT'S MEMORY ADEQUATE TO SAFELY COMPLETE DAILY ACTIVITIES?: YES
ADL_ASSISTANCE: INDEPENDENT
DRESSING YOURSELF: NEEDS ASSISTANCE
HOME_MANAGEMENT_TIME_ENTRY: 16
ADL_ASSISTANCE: INDEPENDENT
BATHING_ASSISTANCE: MINIMAL
BATHING: NEEDS ASSISTANCE
JUDGMENT_ADEQUATE_SAFELY_COMPLETE_DAILY_ACTIVITIES: YES
FEEDING YOURSELF: INDEPENDENT
ADEQUATE_TO_COMPLETE_ADL: YES
HEARING - LEFT EAR: FUNCTIONAL

## 2024-08-07 ASSESSMENT — LIFESTYLE VARIABLES
HOW MANY STANDARD DRINKS CONTAINING ALCOHOL DO YOU HAVE ON A TYPICAL DAY: PATIENT DOES NOT DRINK
HOW OFTEN DO YOU HAVE A DRINK CONTAINING ALCOHOL: NEVER
AUDIT-C TOTAL SCORE: 0
AUDIT-C TOTAL SCORE: 0
HOW OFTEN DO YOU HAVE 6 OR MORE DRINKS ON ONE OCCASION: NEVER
SKIP TO QUESTIONS 9-10: 1

## 2024-08-07 NOTE — CARE PLAN
The patient's goals for the shift include      The clinical goals for the shift include pain management    Over the shift, the patient did not make progress toward the following goals. Barriers to progression include surgical procedure . Recommendations to address these barriers include pain management .

## 2024-08-07 NOTE — OP NOTE
L2-L3 Lateral Discectomy and Fusion; L2-L4 Posterior Decompression and Fusion; Removal of Segmental Instrumentation; Exploration of Fusion Mass; Navigation Operative Note     Date: 2024  OR Location: [facility] OR    Name: Chandni Crawford, : 1950, Age: 73 y.o., MRN: [id number], Sex: female    Diagnosis  Pre-op Diagnosis      * Spinal stenosis, lumbar region with neurogenic claudication [M48.062] Post-op Diagnosis     * Spinal stenosis, lumbar region with neurogenic claudication [M48.062]     Procedures    Lateral L2-3 Discectomy and fusion    L2-3 Decompression  L2-4 Revision Fusion  Removal Segmental Instrumentation  Explore Fusion Mass  Surgical Navigation      L2-L3 Lateral Discectomy and Fusion; L2-L4 Posterior Decompression and Fusion; Removal of Segmental Instrumentation; Exploration of Fusion Mass; Navigation  30556 - [place] POSTERIOR SEGMENTAL INSTRUMENTATION 3-6 VRT SEG    L2-L3 Lateral Discectomy and Fusion; L2-L4 Posterior Decompression and Fusion; Removal of Segmental Instrumentation; Exploration of Fusion Mass; Navigation  75620 - [place] ARTHRD ANT INTERBODY MIN DSC LUMBAR    L2-L3 Lateral Discectomy and Fusion; L2-L4 Posterior Decompression and Fusion; Removal of Segmental Instrumentation; Exploration of Fusion Mass; Navigation  11518 - [place] INSJ BIOMCHN DEV INTERVERTEBRAL DSC SPC W/ARTHRD    L2-L3 Lateral Discectomy and Fusion; L2-L4 Posterior Decompression and Fusion; Removal of Segmental Instrumentation; Exploration of Fusion Mass; Navigation  77809 - [place] SANZ FACETECTOMY & FORAMOTOMY 1 VRT SGM LUMBAR    L2-L3 Lateral Discectomy and Fusion; L2-L4 Posterior Decompression and Fusion; Removal of Segmental Instrumentation; Exploration of Fusion Mass; Navigation  51692 - [place] SANZ FACETECTOMY&FORAMOT 1 VRT SGM EA ADDL SGM    L2-L3 Lateral Discectomy and Fusion; L2-L4 Posterior Decompression and Fusion; Removal of Segmental Instrumentation; Exploration of Fusion Mass; Navigation  89854 - [place] ARTHRODESIS  POSTERIOR/PSTLAT TQ 1NTRSPC LUMBAR    L2-L3 Lateral Discectomy and Fusion; L2-L4 Posterior Decompression and Fusion; Removal of Segmental Instrumentation; Exploration of Fusion Mass; Navigation  27013 - [place] ARTHRODESIS PST/PSTLAT TQ 1NTRSPC EA ADDL NTRSPC    L2-L3 Lateral Discectomy and Fusion; L2-L4 Posterior Decompression and Fusion; Removal of Segmental Instrumentation; Exploration of Fusion Mass; Navigation  20725 - [place] POSTERIOR SEGMENTAL INSTRUMENTATION 3-6 VRT SEG    L2-L3 Lateral Discectomy and Fusion; L2-L4 Posterior Decompression and Fusion; Removal of Segmental Instrumentation; Exploration of Fusion Mass; Navigation  85328 - [place] REMOVAL POSTERIOR SEGMENTAL INSTRUMENTATION    L2-L3 Lateral Discectomy and Fusion; L2-L4 Posterior Decompression and Fusion; Removal of Segmental Instrumentation; Exploration of Fusion Mass; Navigation  30285 - [place] CPTR-ASST SURGICAL NAVIGATION IMAGE-LESS    [place] SANZ FACETECTOMY & FORAMOTOMY 1 VRT SGM LUMBAR [07543]  [place] SANZ FACETECTOMY&FORAMOT 1 VRT SGM EA ADDL SGM [84999]  [place] ARTHRODESIS POSTERIOR/PSTLAT TQ 1NTRSPC LUMBAR [66224]  [place] ARTHRODESIS PST/PSTLAT TQ 1NTRSPC EA ADDL NTRSPC [87601]  [place] REMOVAL POSTERIOR SEGMENTAL INSTRUMENTATION [85329]  [place] ARTHRD ANT INTERBODY MIN DSC LUMBAR [02056]  [place] INSJ BIOMCHN DEV INTERVERTEBRAL DSC SPC W/ARTHRD [35435]  Surgeons      * Guillaume Evangelista - Primary    Resident/Fellow/Other Assistant:  Surgeons and Role:     * YOHANNES SorensenC - STEVO First Assist (sole and primary assistant; no orthopedic resident available)    Procedure Summary  Anesthesia: General  ASA: III  Anesthesia Staff: Anesthesiologist: Cindy Carrillo MD; Stanley Padron MD  CRNA: AMRIT King-CRNA  C-AA: WILEY Hernandez  Estimated Blood Loss: 50 mL  Intra-op Medications:   Administrations occurring from 1130 to 1530 on 08/06/24:   Medication Name Total Dose   magnesium sulfate 2 g in sterile water for injection 50 mL 2 g              Anesthesia Record                Intraprocedure I/O Totals          Intake    Magnesium Sulfate 0.00 mL    The total shown is the total volume documented since Anesthesia Start was filed.    Ketamine 0.00 mL    The total shown is the total volume documented since Anesthesia Start was filed.    LR bolus 2000.00 mL    Total Intake 2000 mL       Output    Urine 850 mL    Est. Blood Loss 50 mL    Total Output 900 mL       Net    Net Volume 1100 mL          Specimen: No specimens collected     Staff:   Circulator: Lane  Scrub Person: Todd  Circulator: Alexandre  Relief Circulator: Farhana Mcdonald Scrub: Vera  Relief Scrub: Lenore         Drains and/or Catheters:   Closed/Suction Drain 1 Inferior;Medial Back Accordion 10 Fr. (Active)   Dressing Status Clean;Dry 08/06/24 1800   Drainage Appearance Serosanguineous 08/06/24 1800   Status To bulb suction 08/06/24 1800   Output (mL) 10 mL 08/06/24 1800       Urethral Catheter Non-latex 16 Fr. (Active)   Site Assessment Clean;Skin intact 08/06/24 1800   Collection Container Standard drainage bag 08/06/24 1800   Securement Method Securing device (Describe) 08/06/24 1800   Output (mL) 750 mL 08/06/24 1800       Tourniquet Times:         Implants:  Implants       Type Name Action Serial No.      Implant BONE, PUTTY DBX  5CC DE-MINERAL ASEPTIC - [id number] - [id number] Implanted [id number]     Spinal Hardware CAGE, 6D 8X45 - SN/A - [id number] Implanted N/A     Graft BONE CHIPS,  CANCELL 30CC 1.7-10MM - [id number] - [id number] Implanted [id number]     Spinal Hardware SCREW, MAS 5.5 X 45 CC SOLERA - [id number] Implanted      Spinal Hardware SCREW, SOLERA 5.5 MAS, 7.5X40 - [id number] Implanted      Spinal Hardware SCREW, SOLERA 5.5 MAS, 7.5X45 - [id number] Implanted      Screw SET SCREW, 5.5, TI NS BRK OFF - [id number] Implanted      Spinal Hardware MAXX, 5.5 X 60 CVD TI SOLERA - [id number] Implanted      Spinal Hardware MAXX, 5.5 X 70 CVD TI SOLERA - [id number] Implanted             Clinical note: This  woman has had 2 prior surgeries done elsewhere.  She has developed adjacent level stenosis at L2-3 with severe lumbar radiculopathy and claudication.  Conservative measures of failed to relieve her symptoms.  Currently she presents for surgery in the form of a lateral interbody fusion at L2-3 and a posterior decompression and fusion L2-4.  The rationale for the above-noted procedures have been discussed at length as have the risks benefits expectations limitations alternatives and potential complications.  She understands and wishes to proceed.    She was brought to the operating room.  A huddle was held, she was identified, antibiotics administered, sequential compression devices placed.  A general anesthetic was secured.  A Soliz catheter was placed.    She was carefully placed in the right lateral decubitus position with all bony prominences well-padded.  A beanbag was deflated her left flank was prepped and draped in a sterile fashion.  A small 3 cm incision was made and carried down sharply through skin and subcutaneous tissue.  The deep muscular layers were incised in line with the skin incision.  The retroperitoneal space was bluntly entered.  The psoas muscle was identified and carefully dissected to expose the L2-3 disc space.  No electrocautery was employed.  Blunt dissection only.  A probe was placed within her vertebral body and x-ray confirmed appropriate levels.  A discectomy was performed employing alyssia curettes and trials.  An 8 mm x 45 mm trial was placed and fit well and was confirmed fluoroscopically to be in good position.  The real implant filled with demineralized bone matrix was impacted into position.  The graft was quite stable and fluoroscopy confirmed appropriate position in both planes.  The wound was copiously irrigated.  Meticulous hemostasis obtained.  This wound was closed in layers with a 4-0 Vicryl for the skin in a subcuticular fashion.  Steri-Strip drips and dry sterile  dressing were applied.  She was carefully turned into the supine position and then turned prone on the Bhaskar frame.  All body prominences well-padded.  Back was prepped and draped in a sterile fashion.  Her prior midline incision was employed and carried down sharply through skin and subcutaneous tissue to the deep fascia.  It was slightly extended proximally to include the L2 level.  A careful subperiosteal dissection exposed the L2 transverse processes.  Care was taken not to disrupt the L1-2 facet joint.  The instrumentation was exposed.  The setscrews and rods were removed.  The screws were assessed.  They had moderately good purchase.  They were removed and cottonoid patties were placed within their holes.  I decompression was performed.  The spinous process of L2 was trimmed.  The lamina was thinned with a Leksell rongeur and a high-speed bur.  A plane was created between the underlying epidural scar and a central and lateral recess decompression was performed.  The inferior facets of L2 were taken down to allow for complete lateral recess and ultimately foraminal decompression.  The local bone was cleaned of all soft tissue and morselized with allograft chips and a bone mill.  A thorough exploration of the fusion mass was obtained.  It did appear to be solid.  The fusion mass and the transverse processes of L2 were decorticated.  The bone graft mixture along with demineralized bone matrix was packed out liberally on either side.  The O-arm was brought in and we employed surgical navigation to place pedicle screws bilaterally at L2 and new screws at both L3 and 4.  Excellent purchase was noted.  Rods were secured to the screws and tightened.  The wound had been copiously irrigated with antibiotic irrigation.  Meticulous hemostasis was obtained.  A Hemovac drain was placed deep to the fascia.  The deep fascia was closed with interrupted #1 Vicryl, the subcutaneous tissue with interrupted 2-0 Vicryl and the  skin with a 4-0 Vicryl in a running subcuticular fashion.  Steri-Strips and a dry sterile dressing were applied.  She was carefully turned into the supine position on the hospital bed, awake in the next bed and transferred to the recovery room in stable condition.    ** Dictated with voice recognition software and not immediately reviewed for errors in grammar and/or spelling **    Attending Attestation: I was present and scrubbed for the entire procedure.    Guillaume Evangelista  Phone Number: [phone]

## 2024-08-07 NOTE — PROGRESS NOTES
Occupational Therapy    Evaluation/Treatment    Patient Name: Chandni Crawford  MRN: [id number]  : 1950  Today's Date: 24  Time Calculation  Start Time: 1012  Stop Time: 1043  Time Calculation (min): 31 min       Assessment:  OT Assessment:  (72 y/o female admitted for lumbar spine surgery, presents with decreased ADLS, decreased transfers, decreased bed mobility skills and would benefit from LOW intensity therapy to maximize functional independence.)  Prognosis: Good  Barriers to Discharge: None  Evaluation/Treatment Tolerance: Patient tolerated treatment well  Medical Staff Made Aware: Yes  End of Session Communication: Bedside nurse  End of Session Patient Position: Up in chair, Alarm on  OT Assessment Results: Decreased ADL status, Decreased safe judgment during ADL, Decreased endurance, Decreased sensation, Decreased functional mobility  Prognosis: Good  Barriers to Discharge: None  Evaluation/Treatment Tolerance: Patient tolerated treatment well  Medical Staff Made Aware: Yes  Strengths: Ability to acquire knowledge  Plan:  Treatment Interventions: ADL retraining, Functional transfer training, Endurance training, Patient/family training, Neuromuscular reeducation  OT Frequency: 3 times per week  OT Discharge Recommendations: Low intensity level of continued care  Equipment Recommended upon Discharge:  (Hip Kit)  OT Recommended Transfer Status: Stand by assist  OT - OK to Discharge: Yes  Treatment Interventions: ADL retraining, Functional transfer training, Endurance training, Patient/family training, Neuromuscular reeducation    Subjective   Current Problem:  1. Spinal stenosis, lumbar region with neurogenic claudication  FL less than 1 hour    FL less than 1 hour    CANCELED: XR lumbar spine 1 view    CANCELED: XR lumbar spine 1 view        General:   OT Received On: 24  General  Reason for Referral:  (72 y/o female admitted with L2-L3 Lateral Discectomy and fusion, L2-L4 Posterior  "decompression and fusion.)  Referred By:  (Jean Carlos)  Past Medical History Relevant to Rehab:   Past Medical History:   Diagnosis Date    CAD (coronary artery disease)     followed by cardiologist Dr. Uday saenz in [place], task made for notes, testing and per patient she was cleared from a cardiac standpoint    Chronic low back pain     Depression with anxiety     per patient\"thats why I'm on Lamictal\"    DM (diabetes mellitus) (Multi)     no recent A1c, task made for PCP note and recent labs from Dr. Jalil Mckinney in [place]    HLD (hyperlipidemia)     HTN (hypertension)     Hypothyroidism     Neuropathy     per patient constant tingling in both feet; Right leg is painful to touch and tingling    OA (osteoarthritis)     Post-laminectomy syndrome     Ringing in ears, bilateral     constant    Short-term memory loss     Spinal stenosis of lumbar region with neurogenic claudication     Spondylolisthesis        Family/Caregiver Present: No  Prior to Session Communication: Bedside nurse  Patient Position Received: Bed, 3 rail up, Alarm on  General Comment:  (Patient is pleasand and cooperative and motivated to regain prior level of independence.)  Precautions:  Medical Precautions: Fall precautions  Post-Surgical Precautions:  (Lumbar Spine Precautions.)    Pain:  Pain Assessment  Pain Assessment: 0-10  0-10 (Numeric) Pain Score: 8  Pain Type: Surgical pain  Pain Location: Back  Pain Orientation: Right    Objective   Cognition:  Overall Cognitive Status: Within Functional Limits  Orientation Level: Oriented X4     Home Living:  Type of Home:  (Condo with elevator, bed/bath on first floor, walk in shower, std ht toilet. Patient also has home in [facility], will stay at [facility] while she recovers.)  Lives With: Spouse  Prior Function:  Level of [place]: Independent with ADLs and functional transfers  Receives Help From: Family  ADL Assistance: Independent  Homemaking Assistance: Independent (Shared home management " tasks)  Ambulatory Assistance: Independent (No assistive device)  Hand Dominance: Right  IADL History:  Homemaking Responsibilities: Yes  ADL:  Eating Assistance: Independent  Grooming Assistance: Stand by  Bathing Assistance: Minimal (Anticipated to bathe below knees.)  UE Dressing Assistance: Stand by  LE Dressing Assistance: Maximal (Unable to doff socks due to decreased flexibility to cross leg to opposite knee)  Toileting Assistance with Device: Minimal  Functional Assistance: Minimal    Activities of Daily Living:    LE Dressing  LE Dressing: Yes  LE Dressing Adaptive Equipment: Reacher, Sock aide, Dressing stick  Pants Level of Assistance: Contact guard (Patient educated regarding use of LE adaptive equipment)  Sock Level of Assistance: Contact guard (Cues for use of LE adaptive equipment.)  LE Dressing Where Assessed: Edge of bed  LE Dressing Comments:  (Educated patient regarding use of LE adaptive equipment and where to purchase DME.)    Activity Tolerance:  Endurance: Tolerates 10 - 20 min exercise with multiple rests  Activity Tolerance Comments:  (Fair activity tolerance.)    Bed Mobility/Transfers: Bed Mobility  Bed Mobility: Yes  Bed Mobility 1  Bed Mobility 1: Supine to sitting  Level of Assistance 1: Contact guard  Bed Mobility Comments 1:  (via log roll)    Transfers  Transfer: Yes  Transfer 1  Transfer From 1: Bed to  Transfer to 1: Chair with arms  Technique 1: Sit to stand  Transfer Device 1: Walker  Transfer Level of Assistance 1: Contact guard  Trials/Comments 1:  (Cues for hand placement and sequencing.)    Sitting Balance:  Static Sitting Balance  Static Sitting-Balance Support: Feet supported  Static Sitting-Level of Assistance: Distant supervision  Dynamic Sitting Balance  Dynamic Sitting-Balance Support: Feet supported  Dynamic Sitting-Balance: Forward lean  Dynamic Sitting-Comments:  (CGA required)  Standing Balance:  Static Standing Balance  Static Standing-Balance Support: Bilateral  upper extremity supported  Static Standing-Level of Assistance: Contact guard  Dynamic Standing Balance  Dynamic Standing-Balance Support: Bilateral upper extremity supported  Dynamic Standing-Balance: Reaching for objects  Dynamic Standing-Comments:  (CGA required.)    Vision:Vision - Basic Assessment  Current Vision: Wears glasses all the time    Strength:  Strength Comments:  (B UE- N/E due to Lumbar spine precautions.)    Hand Function:  Hand Function  Gross Grasp: Functional  Coordination: Functional    Outcome Measures: [facility] Daily Activity  Putting on and taking off regular lower body clothing: A little  Bathing (including washing, rinsing, drying): A little  Putting on and taking off regular upper body clothing: A little  Toileting, which includes using toilet, bedpan or urinal: A little  Taking care of personal grooming such as brushing teeth: None  Eating Meals: None  Daily Activity - Total Score: 20    Goals:  Encounter Problems       Encounter Problems (Active)       ADLs       Patient will perform UB and LB bathing with independent level of assistance.        Start:  08/07/24    Expected End:  08/21/24            Patient with complete upper body dressing with independent level of assistance donning and doffing all UE clothes with no adaptive equipment while edge of bed        Start:  08/07/24    Expected End:  08/21/24            Patient with complete lower body dressing with independent level of assistance donning and doffing all LE clothes  with adaptive equipment while edge of bed        Start:  08/07/24    Expected End:  08/21/24            Patient will complete daily grooming tasks brushing teeth and washing face/hair with independent level of assistance and PRN adaptive equipment while standing.       Start:  08/07/24    Expected End:  08/21/24            Patient will complete toileting including hygiene clothing management/hygiene with independent level of assistance and raised toilet seat.        Start:  08/07/24    Expected End:  08/21/24               BALANCE       Pt will maintain dynamic standing balance during ADL task with independent level of assistance in order to demonstrate decreased risk of falling and improved postural control.       Start:  08/07/24    Expected End:  08/21/24               TRANSFERS       Patient will perform bed mobility independent level of assistance and bed rails in order to improve safety and independence with mobility       Start:  08/07/24    Expected End:  08/21/24            Patient will complete functional transfer to all surfaces with front wheeled walker with modified independent level of assistance.       Start:  08/07/24    Expected End:  08/21/24

## 2024-08-07 NOTE — PROGRESS NOTES
Chandni Crawford is a 73 y.o. female on day 1 of admission presenting with Spinal stenosis, lumbar region with neurogenic claudication.    Spoke with patient to discuss her preferences for care. Discussed how patient manages health at home. Lives home with kailyn. Independent in all ADLS.  No home O2, dialysis, or assistive devices. Patient denies any problems getting to appointments or obtaining/affording medications.  No additional resources or needs identified.    Plan: s/p decompression and fusion, in bed this morning with PCA pump. States she is feeling ok and pain is controlled. Kailyn plans to pick her up on DC when she leaves hospital. No needs noted at this time.  Status: inpatient  Payor: medicare  Disposition: Home  Barrier: pain control, surgery clearance  ADOD:   2-3 days     08/07/24 1103   Discharge Planning   Living Arrangements Spouse/significant other   Support Systems Spouse/significant other;Friends/neighbors   Assistance Needed independent, drives, works part time   Type of Residence Private residence   Number of Stairs to Enter Residence 1   Number of Stairs Within Residence 0   Do you have animals or pets at home? Yes   Type of Animals or Pets dog   Who is requesting discharge planning? Patient   Home or Post Acute Services None   Expected Discharge Disposition Home   Does the patient need discharge transport arranged? No       Zuleima Burt RN

## 2024-08-07 NOTE — PROGRESS NOTES
Physical Therapy    Physical Therapy Evaluation    Patient Name: Chandni Crawford  MRN: [id number]  Today's Date: 8/7/2024   Time Calculation  Start Time: 1133  Stop Time: 1153  Time Calculation (min): 20 min    Assessment/Plan   PT Assessment  PT Assessment Results: Decreased strength, Decreased range of motion, Decreased endurance, Impaired balance, Decreased mobility, Pain, Orthopedic restrictions  Rehab Prognosis: Good  End of Session Communication: Bedside nurse  Assessment Comment: PT Evaluation Completed. The patient presented with decreased mobility, balance, endurance and increased pain and fatigue. These impairments are negatively impacting his ability to perform at baseline functional level, in home environment. This patient would benefit from skilled therapy intervention to address limitations and progress towards the PT goals.  Anticipate low frequency PT needs at discharge  End of Session Patient Position: Up in chair, Alarm on  IP OR SWING BED PT PLAN  Inpatient or Swing Bed: Inpatient  PT Plan  Treatment/Interventions: Bed mobility, Transfer training, Gait training, Stair training, Balance training, Strengthening, Endurance training, Range of motion, Therapeutic exercise, Therapeutic activity, Home exercise program  PT Plan: Ongoing PT  PT Frequency: Daily  PT Discharge Recommendations: Low intensity level of continued care  PT Recommended Transfer Status: Assist x1  PT - OK to Discharge: Yes (PT POC established)      Subjective   General Visit Information:  General  Reason for Referral: Lateral L2-3 Discectomy and fusion, L2-3 Decompression, L2-4 Revision Fusion  Referred By: Louise Hernandez PA-C  Past Medical History Relevant to Rehab:   Past Medical History:   Diagnosis Date    CAD (coronary artery disease)     followed by cardiologist Dr. Uday saenz in [place], task made for notes, testing and per patient she was cleared from a cardiac standpoint    Chronic low back pain     Depression with  "anxiety     per patient\"thats why I'm on Lamictal\"    DM (diabetes mellitus) (Multi)     no recent A1c, task made for PCP note and recent labs from Dr. Jalil Mckinney in [place]    HLD (hyperlipidemia)     HTN (hypertension)     Hypothyroidism     Neuropathy     per patient constant tingling in both feet; Right leg is painful to touch and tingling    OA (osteoarthritis)     Post-laminectomy syndrome     Ringing in ears, bilateral     constant    Short-term memory loss     Spinal stenosis of lumbar region with neurogenic claudication     Spondylolisthesis        Prior to Session Communication: Bedside nurse  Patient Position Received: Alarm on, Up in chair  General Comment: Pt pleasant and agreeable to PT eval.  Home Living:  Home Living  Type of Home: Condo  Lives With:  (fiance)  Home Adaptive Equipment: None  Home Layout: One level  Home Access: Elevator  Bathroom Shower/Tub: Walk-in shower  Bathroom Toilet: Standard  Home Living Comments: Pt normally lives in [place] however has a condo in town she will be staying in currently.  Prior Level of Function:  Prior Function Per Pt/Caregiver Report  Level of [place]: Independent with ADLs and functional transfers, Independent with homemaking with ambulation  Receives Help From: Family  ADL Assistance: Independent  Homemaking Assistance: Independent  Ambulatory Assistance: Independent  Prior Function Comments: Drives. Denies any recent falls.  Precautions:  Precautions  Medical Precautions: Fall precautions  Post-Surgical Precautions: Spinal precautions      Objective   Pain:  Pain Assessment  Pain Assessment: 0-10  0-10 (Numeric) Pain Score:  (Endorses pain with sit <> stand however does not provide a rating.)  Cognition:  Cognition  Overall Cognitive Status: Within Functional Limits  Orientation Level: Oriented X4    General Assessments:  Activity Tolerance  Endurance: Endurance does not limit participation in activity    Sensation  Light Touch:  " (Numbness/tingling in R thigh)         Postural Control  Postural Control: Within Functional Limits    Static Sitting Balance  Static Sitting-Balance Support: Feet supported, Bilateral upper extremity supported  Static Sitting-Level of Assistance: Close supervision  Dynamic Sitting Balance  Dynamic Sitting-Balance Support: Feet supported, Bilateral upper extremity supported  Dynamic Sitting-Comments: Close supervision    Static Standing Balance  Static Standing-Balance Support: Bilateral upper extremity supported  Static Standing-Level of Assistance: Close supervision  Dynamic Standing Balance  Dynamic Standing-Balance Support: Bilateral upper extremity supported  Dynamic Standing-Comments: Close supervision  Functional Assessments:       Transfers  Transfer: Yes  Transfer 1  Technique 1: Sit to stand, Stand to sit  Transfer Device 1: Walker  Transfer Level of Assistance 1: Contact guard  Trials/Comments 1: Cues for hand placement. Slow to rise to standing with increased pain.    Ambulation/Gait Training  Ambulation/Gait Training Performed: Yes  Ambulation/Gait Training 1  Surface 1: Level tile  Device 1: Rolling walker  Assistance 1: Close supervision  Comments/Distance (ft) 1: 450 ft. Decreased molly with min cues for walker placement and upright posture. Overall demonstrating good stability.  Extremity/Trunk Assessments:  RUE   RUE : Within Functional Limits  LUE   LUE: Within Functional Limits  RLE   RLE :  (Strength >3/5 based on observation of functional mobility. ROM WFL.)  LLE   LLE :  (Strength >3/5 based on observation of functional mobility. ROM WFL.)  Outcome Measures:  [facility] Basic Mobility  Turning from your back to your side while in a flat bed without using bedrails: A little  Moving from lying on your back to sitting on the side of a flat bed without using bedrails: A little  Moving to and from bed to chair (including a wheelchair): A little  Standing up from a chair using your arms (e.g.  wheelchair or bedside chair): A little  To walk in hospital room: A little  Climbing 3-5 steps with railing: A little  Basic Mobility - Total Score: 18    Encounter Problems       Encounter Problems (Active)       Balance       complete all mobility with normal balance while dual tasking, negotiating in a dynamic environment, carrying items, etc., with proactive and reactive static and dynamic standing and sitting tasks independently >15 minutes.       Start:  08/07/24    Expected End:  08/21/24               Mobility       STG - Patient will ambulate 450 ft independently with no assistive device.        Start:  08/07/24    Expected End:  08/21/24            Pt will ascend/descend 3 stairs mod I using unilateral HR.        Start:  08/07/24    Expected End:  08/21/24               PT Transfers       STG - Patient will perform bed mobility independently using log roll technique.        Start:  08/07/24    Expected End:  08/21/24            STG - Patient will transfer sit to and from stand independently.        Start:  08/07/24    Expected End:  08/21/24               Pain - Adult              Education Documentation  Handouts, taught by Kelly Brock, PT at 8/7/2024  1:54 PM.  Learner: Patient  Readiness: Acceptance  Method: Explanation  Response: Verbalizes Understanding    Precautions, taught by Kelly Brock, PT at 8/7/2024  1:54 PM.  Learner: Patient  Readiness: Acceptance  Method: Explanation  Response: Verbalizes Understanding    Body Mechanics, taught by Kelly Brock, PT at 8/7/2024  1:54 PM.  Learner: Patient  Readiness: Acceptance  Method: Explanation  Response: Verbalizes Understanding    Mobility Training, taught by Kelly Brock, PT at 8/7/2024  1:54 PM.  Learner: Patient  Readiness: Acceptance  Method: Explanation  Response: Verbalizes Understanding    Education Comments  No comments found.

## 2024-08-07 NOTE — NURSING NOTE
Interdisciplinary team present: NP, PT, NM, CC, SW, Orthopedic Coordinator, and bedside RN.  Pain - controlled  Nausea - none  Discharge barrier - still has PCA and mann  Discharge plan - Home  Discharge date/time - pending

## 2024-08-08 LAB
ERYTHROCYTE [DISTWIDTH] IN BLOOD BY AUTOMATED COUNT: 12.5 % (ref 11.5–14.5)
GLUCOSE BLD MANUAL STRIP-MCNC: 127 MG/DL (ref 74–99)
GLUCOSE BLD MANUAL STRIP-MCNC: 130 MG/DL (ref 74–99)
GLUCOSE BLD MANUAL STRIP-MCNC: 179 MG/DL (ref 74–99)
HCT VFR BLD AUTO: 22.5 % (ref 36–46)
HGB BLD-MCNC: 7.1 G/DL (ref 12–16)
MCH RBC QN AUTO: 30.9 PG (ref 26–34)
MCHC RBC AUTO-ENTMCNC: 31.6 G/DL (ref 32–36)
MCV RBC AUTO: 98 FL (ref 80–100)
NRBC BLD-RTO: 0 /100 WBCS (ref 0–0)
PLATELET # BLD AUTO: 210 X10*3/UL (ref 150–450)
RBC # BLD AUTO: 2.3 X10*6/UL (ref 4–5.2)
WBC # BLD AUTO: 11.7 X10*3/UL (ref 4.4–11.3)

## 2024-08-08 PROCEDURE — 85027 COMPLETE CBC AUTOMATED: CPT | Performed by: ORTHOPAEDIC SURGERY

## 2024-08-08 PROCEDURE — 2500000002 HC RX 250 W HCPCS SELF ADMINISTERED DRUGS (ALT 637 FOR MEDICARE OP, ALT 636 FOR OP/ED): Performed by: PHYSICIAN ASSISTANT

## 2024-08-08 PROCEDURE — 97116 GAIT TRAINING THERAPY: CPT | Mod: GP,CQ

## 2024-08-08 PROCEDURE — 1100000001 HC PRIVATE ROOM DAILY

## 2024-08-08 PROCEDURE — 97530 THERAPEUTIC ACTIVITIES: CPT | Mod: GO,CO

## 2024-08-08 PROCEDURE — 36415 COLL VENOUS BLD VENIPUNCTURE: CPT | Performed by: ORTHOPAEDIC SURGERY

## 2024-08-08 PROCEDURE — 82947 ASSAY GLUCOSE BLOOD QUANT: CPT

## 2024-08-08 PROCEDURE — 9420000001 HC RT PATIENT EDUCATION 5 MIN

## 2024-08-08 PROCEDURE — 2500000001 HC RX 250 WO HCPCS SELF ADMINISTERED DRUGS (ALT 637 FOR MEDICARE OP): Performed by: PHYSICIAN ASSISTANT

## 2024-08-08 PROCEDURE — 2500000004 HC RX 250 GENERAL PHARMACY W/ HCPCS (ALT 636 FOR OP/ED): Mod: JZ | Performed by: PHYSICIAN ASSISTANT

## 2024-08-08 PROCEDURE — 97530 THERAPEUTIC ACTIVITIES: CPT | Mod: GP,CQ

## 2024-08-08 ASSESSMENT — PAIN SCALES - GENERAL
PAINLEVEL_OUTOF10: 0 - NO PAIN
PAINLEVEL_OUTOF10: 4
PAINLEVEL_OUTOF10: 0 - NO PAIN
PAINLEVEL_OUTOF10: 0 - NO PAIN

## 2024-08-08 ASSESSMENT — COGNITIVE AND FUNCTIONAL STATUS - GENERAL
DRESSING REGULAR LOWER BODY CLOTHING: A LITTLE
STANDING UP FROM CHAIR USING ARMS: A LITTLE
TURNING FROM BACK TO SIDE WHILE IN FLAT BAD: A LITTLE
DAILY ACTIVITIY SCORE: 17
MOVING FROM LYING ON BACK TO SITTING ON SIDE OF FLAT BED WITH BEDRAILS: A LITTLE
DAILY ACTIVITIY SCORE: 19
MOVING FROM LYING ON BACK TO SITTING ON SIDE OF FLAT BED WITH BEDRAILS: A LITTLE
HELP NEEDED FOR BATHING: A LITTLE
HELP NEEDED FOR BATHING: A LOT
DRESSING REGULAR UPPER BODY CLOTHING: A LITTLE
MOVING TO AND FROM BED TO CHAIR: A LITTLE
DRESSING REGULAR UPPER BODY CLOTHING: A LITTLE
CLIMB 3 TO 5 STEPS WITH RAILING: A LITTLE
DRESSING REGULAR LOWER BODY CLOTHING: A LITTLE
MOBILITY SCORE: 18
WALKING IN HOSPITAL ROOM: A LITTLE
CLIMB 3 TO 5 STEPS WITH RAILING: A LITTLE
PERSONAL GROOMING: A LITTLE
MOVING FROM LYING ON BACK TO SITTING ON SIDE OF FLAT BED WITH BEDRAILS: A LITTLE
DRESSING REGULAR UPPER BODY CLOTHING: A LITTLE
TURNING FROM BACK TO SIDE WHILE IN FLAT BAD: A LITTLE
TURNING FROM BACK TO SIDE WHILE IN FLAT BAD: A LITTLE
MOBILITY SCORE: 18
PERSONAL GROOMING: A LITTLE
TOILETING: A LITTLE
WALKING IN HOSPITAL ROOM: A LITTLE
WALKING IN HOSPITAL ROOM: A LITTLE
DAILY ACTIVITIY SCORE: 18
CLIMB 3 TO 5 STEPS WITH RAILING: A LITTLE
TOILETING: A LITTLE
TOILETING: A LITTLE
STANDING UP FROM CHAIR USING ARMS: A LITTLE
HELP NEEDED FOR BATHING: A LOT
PERSONAL GROOMING: A LITTLE
STANDING UP FROM CHAIR USING ARMS: A LITTLE
DRESSING REGULAR LOWER BODY CLOTHING: A LOT
MOVING TO AND FROM BED TO CHAIR: A LITTLE
MOBILITY SCORE: 18
MOVING TO AND FROM BED TO CHAIR: A LITTLE

## 2024-08-08 ASSESSMENT — PAIN DESCRIPTION - LOCATION: LOCATION: BACK

## 2024-08-08 ASSESSMENT — PAIN DESCRIPTION - ORIENTATION: ORIENTATION: RIGHT

## 2024-08-08 ASSESSMENT — PAIN - FUNCTIONAL ASSESSMENT
PAIN_FUNCTIONAL_ASSESSMENT: 0-10

## 2024-08-08 ASSESSMENT — ACTIVITIES OF DAILY LIVING (ADL): HOME_MANAGEMENT_TIME_ENTRY: 5

## 2024-08-08 NOTE — PROGRESS NOTES
"Chandni Crawford is a 73 y.o. female on day 2 of admission presenting with Spinal stenosis, lumbar region with neurogenic claudication.    Subjective   POD 1    Pre-op leg pain improved  Afeb vss  Adequate urine o/p  Strebgth intact  Mild left hip flexion weakness    Stable    Mobilize  Maintain PCA and mann       Objective     Physical Exam    Last Recorded Vitals  Blood pressure 122/50, pulse 78, temperature 37.3 °C (99.1 °F), temperature source Oral, resp. rate 16, height 1.6 m (5' 3\"), weight 59.1 kg (130 lb 4.7 oz), SpO2 92%.  Intake/Output last 3 Shifts:  I/O last 3 completed shifts:  In: 700 (11.8 mL/kg) [P.O.:400; I.V.:300 (5.1 mL/kg)]  Out: 2500 (42.3 mL/kg) [Urine:2070 (1 mL/kg/hr); Drains:430]  Dosing Weight: 59.1 kg     Relevant Results      Scheduled medications  acetaminophen, 650 mg, oral, q6h  aspirin, 81 mg, oral, Daily  atorvastatin, 40 mg, oral, Nightly  ceFAZolin, 2 g, intravenous, q8h  docusate sodium, 100 mg, oral, BID  gabapentin, 600 mg, oral, TID  lamoTRIgine, 200 mg, oral, Daily  levothyroxine, 100 mcg, oral, Daily  losartan 100 mg, hydroCHLOROthiazide 12.5 mg for Hyzaar 100 mg-12.5 mg, , oral, Daily  metFORMIN, 1,000 mg, oral, BID  methocarbamol, 500 mg, oral, 4x daily  progesterone, 100 mg, oral, Daily  thiamine, 100 mg, oral, Daily      Continuous medications  HYDROmorphone,   lactated Ringer's, 100 mL/hr, Last Rate: 100 mL/hr (08/07/24 2301)  lactated Ringer's, 100 mL/hr, Last Rate: 100 mL/hr (08/07/24 2301)      PRN medications  PRN medications: dextrose, dextrose, diphenhydrAMINE, glucagon, glucagon, [Held by provider] HYDROmorphone, naloxone, naloxone, ondansetron ODT **OR** ondansetron, [Held by provider] oxyCODONE, [Held by provider] oxyCODONE, [Held by provider] oxyCODONE, oxygen, zolpidem  Results for orders placed or performed during the hospital encounter of 08/06/24 (from the past 24 hour(s))   POCT GLUCOSE   Result Value Ref Range    POCT Glucose 222 (H) 74 - 99 mg/dL   POCT " GLUCOSE   Result Value Ref Range    POCT Glucose 127 (H) 74 - 99 mg/dL   POCT GLUCOSE   Result Value Ref Range    POCT Glucose 179 (H) 74 - 99 mg/dL                            Assessment/Plan   Principal Problem:    Spinal stenosis, lumbar region with neurogenic claudication  Active Problems:    CAD (coronary artery disease)    HTN (hypertension)    Hypothyroidism    DM (diabetes mellitus) (Multi)    Depression with anxiety    HLD (hyperlipidemia)    Neuropathy    Short-term memory loss    Buttock pain    Chronic pain            I spent   minutes in the professional and overall care of this patient.      Guillaume Evangelista MD

## 2024-08-08 NOTE — PROGRESS NOTES
"Occupational Therapy    Occupational Therapy Treatment    Name: Chandni Crawford  MRN: [id number]  : 1950  Date: 24  Time Calculation  Start Time: 1325  Stop Time: 1340  Time Calculation (min): 15 min    Assessment:  Medical Staff Made Aware: Yes  End of Session Communication: Bedside nurse  End of Session Patient Position: Bed, 3 rail up, Alarm off, caregiver present  Plan:  Treatment Interventions: ADL retraining, Functional transfer training, Endurance training, Patient/family training, Neuromuscular reeducation  OT Frequency: 3 times per week  OT Discharge Recommendations: Low intensity level of continued care  Equipment Recommended upon Discharge: Other (comment) (hip kit; shower chair)  OT Recommended Transfer Status: Stand by assist  OT - OK to Discharge: Yes (per POC)    Subjective   Previous Visit Info:  OT Last Visit  OT Received On: 24  General:  General  Reason for Referral: Lateral L2-3 Discectomy and fusion, L2-3 Decompression, L2-4 Revision Fusion  Missed Visit: Yes  Missed Visit Reason: Patient refused (Attempted OT tx, pt refusing therapy requesting to rest d/t \"extreme fatigue\". Max VC provided on importance of therapy, pt declining OT tx stating, \"I already did all that, I know how.\" Pt adamantly declining, RN notified.)  Family/Caregiver Present: Yes  Caregiver Feedback: Partner present  Prior to Session Communication: Bedside nurse  Patient Position Received: Bed, 3 rail up, Alarm off, caregiver present  Preferred Learning Style: auditory, kinesthetic, verbal  General Comment: pt coopeative and compliant this day  Precautions:  Medical Precautions: Fall precautions  Post-Surgical Precautions: Spinal precautions  Precautions Comment: Pt able to state and adhere to throughout session    Pain Assessment:  Pain Assessment  Pain Assessment: 0-10  0-10 (Numeric) Pain Score: 0 - No pain     Objective   Cognition:  Orientation Level: Oriented X4  Activities of Daily Living: UE " Dressing  UE Dressing Level of Assistance: Minimum assistance  UE Dressing Where Assessed: Edge of bed  UE Dressing Comments: d/t line management    Functional Standing Tolerance:     Bed Mobility/Transfers: Bed Mobility  Bed Mobility: Yes  Bed Mobility 1  Bed Mobility 1: Sitting to supine, Long sit  Level of Assistance 1: Minimum assistance  Bed Mobility Comments 1: assist for LE advancement    Transfers  Transfer: Yes  Transfer 1  Transfer From 1: Bed to  Transfer to 1: Stand  Technique 1: Sit to stand, Stand to sit  Transfer Device 1: Walker  Transfer Level of Assistance 1: Close supervision      Functional Mobility:  Functional Mobility  Functional Mobility Performed: Yes  Functional Mobility 1  Surface 1: Level tile  Device 1: Rolling walker  Assistance 1: Close supervision  Comments 1: Pt completed functional mobility with FWW a community distance at [facility].    Other Activity:  Other Activity  Other Activity Performed: Yes  Other Activity 1: Provided extensive education on adaptive equipment/devices for optimal safe homegoing. Fair carryover.    Outcome Measures:  [facility] Daily Activity  Putting on and taking off regular lower body clothing: A little  Bathing (including washing, rinsing, drying): A lot  Putting on and taking off regular upper body clothing: A little  Toileting, which includes using toilet, bedpan or urinal: A little  Taking care of personal grooming such as brushing teeth: A little  Eating Meals: None  Daily Activity - Total Score: 18      Education Documentation  ADL Training, taught by ANGELES Zavala at 8/8/2024  1:59 PM.  Learner: Patient  Readiness: Acceptance  Method: Explanation, Demonstration  Response: Verbalizes Understanding, Demonstrated Understanding    Education Comments  No comments found.      Goals:  Encounter Problems       Encounter Problems (Active)       ADLs       Patient will perform UB and LB bathing with independent level of assistance.  (Not Progressing)        Start:  08/07/24    Expected End:  08/21/24            Patient with complete upper body dressing with independent level of assistance donning and doffing all UE clothes with no adaptive equipment while edge of bed  (Progressing)       Start:  08/07/24    Expected End:  08/21/24            Patient with complete lower body dressing with independent level of assistance donning and doffing all LE clothes  with adaptive equipment while edge of bed  (Not Progressing)       Start:  08/07/24    Expected End:  08/21/24            Patient will complete daily grooming tasks brushing teeth and washing face/hair with independent level of assistance and PRN adaptive equipment while standing. (Not Progressing)       Start:  08/07/24    Expected End:  08/21/24            Patient will complete toileting including hygiene clothing management/hygiene with independent level of assistance and raised toilet seat. (Not Progressing)       Start:  08/07/24    Expected End:  08/21/24               BALANCE       Pt will maintain dynamic standing balance during ADL task with independent level of assistance in order to demonstrate decreased risk of falling and improved postural control. (Not Progressing)       Start:  08/07/24    Expected End:  08/21/24               TRANSFERS       Patient will perform bed mobility independent level of assistance and bed rails in order to improve safety and independence with mobility (Progressing)       Start:  08/07/24    Expected End:  08/21/24            Patient will complete functional transfer to all surfaces with front wheeled walker with modified independent level of assistance. (Progressing)       Start:  08/07/24    Expected End:  08/21/24

## 2024-08-08 NOTE — PROGRESS NOTES
"Occupational Therapy                 Therapy Communication Note    Patient Name: Chandni Crawford  MRN: [id number]  Today's Date: 8/8/2024     Discipline: Occupational Therapy    Missed Visit Reason: Missed Visit Reason: Patient refused (Attempted OT tx, pt refusing therapy requesting to rest d/t \"extreme fatigue\". Max VC provided on importance of therapy, pt declining OT tx stating, \"I already did all that, I know how.\" Pt adamantly declining, RN notified.)    Missed Time: Attempt      "

## 2024-08-08 NOTE — PROGRESS NOTES
"Chandni Crawford is a 73 y.o. female on day 2 of admission presenting with Spinal stenosis, lumbar region with neurogenic claudication.    Subjective   POD 2    Ambulating short distances  Pre-op leg pain resolving  Mild left hip flexion weakness  Mild low BP  Adequate urine o/p    Stable    Re-check Hct  Remove mann  Continue to mobilize         Objective     Physical Exam    Last Recorded Vitals  Blood pressure 122/50, pulse 78, temperature 37.3 °C (99.1 °F), temperature source Oral, resp. rate 16, height 1.6 m (5' 3\"), weight 59.1 kg (130 lb 4.7 oz), SpO2 92%.  Intake/Output last 3 Shifts:  I/O last 3 completed shifts:  In: 700 (11.8 mL/kg) [P.O.:400; I.V.:300 (5.1 mL/kg)]  Out: 2500 (42.3 mL/kg) [Urine:2070 (1 mL/kg/hr); Drains:430]  Dosing Weight: 59.1 kg     Relevant Results      Scheduled medications  acetaminophen, 650 mg, oral, q6h  aspirin, 81 mg, oral, Daily  atorvastatin, 40 mg, oral, Nightly  ceFAZolin, 2 g, intravenous, q8h  docusate sodium, 100 mg, oral, BID  gabapentin, 600 mg, oral, TID  lamoTRIgine, 200 mg, oral, Daily  levothyroxine, 100 mcg, oral, Daily  losartan 100 mg, hydroCHLOROthiazide 12.5 mg for Hyzaar 100 mg-12.5 mg, , oral, Daily  metFORMIN, 1,000 mg, oral, BID  methocarbamol, 500 mg, oral, 4x daily  progesterone, 100 mg, oral, Daily  thiamine, 100 mg, oral, Daily      Continuous medications  HYDROmorphone,   lactated Ringer's, 100 mL/hr, Last Rate: 100 mL/hr (08/07/24 2301)  lactated Ringer's, 100 mL/hr, Last Rate: 100 mL/hr (08/07/24 2301)      PRN medications  PRN medications: dextrose, dextrose, diphenhydrAMINE, glucagon, glucagon, [Held by provider] HYDROmorphone, naloxone, naloxone, ondansetron ODT **OR** ondansetron, [Held by provider] oxyCODONE, [Held by provider] oxyCODONE, [Held by provider] oxyCODONE, oxygen, zolpidem  Results for orders placed or performed during the hospital encounter of 08/06/24 (from the past 24 hour(s))   POCT GLUCOSE   Result Value Ref Range    POCT " Glucose 222 (H) 74 - 99 mg/dL   POCT GLUCOSE   Result Value Ref Range    POCT Glucose 127 (H) 74 - 99 mg/dL   POCT GLUCOSE   Result Value Ref Range    POCT Glucose 179 (H) 74 - 99 mg/dL                            Assessment/Plan   Principal Problem:    Spinal stenosis, lumbar region with neurogenic claudication  Active Problems:    CAD (coronary artery disease)    HTN (hypertension)    Hypothyroidism    DM (diabetes mellitus) (Multi)    Depression with anxiety    HLD (hyperlipidemia)    Neuropathy    Short-term memory loss    Buttock pain    Chronic pain            I spent   minutes in the professional and overall care of this patient.      Guillaume Evangelista MD

## 2024-08-08 NOTE — CARE PLAN
The patient's goals for the shift include      The clinical goals for the shift include pains management    Over the shift, the patient did not make progress toward the following goals. Barriers to progression include back surgery. Recommendations to address these barriers include pain management.

## 2024-08-08 NOTE — PROGRESS NOTES
"Physical Therapy    Physical Therapy Treatment    Patient Name: Chandni Crawford  MRN: [id number]  Today's Date: 8/8/2024  Time Calculation  Start Time: 0853  Stop Time: 0916  Time Calculation (min): 23 min    Assessment/Plan   PT Assessment  Rehab Prognosis: Good  End of Session Communication: Bedside nurse  Assessment Comment: Pt requires mod encouragement to participate in PT session this date. Fair activity tolerance noted with fair safety and use of WW.  End of Session Patient Position: Up in chair, Alarm on  PT Plan  Inpatient/Swing Bed or Outpatient: Inpatient  PT Plan  Treatment/Interventions: Bed mobility, Transfer training, Gait training  PT Plan: Ongoing PT  PT Frequency: Daily  PT Discharge Recommendations: Low intensity level of continued care  Equipment Recommended upon Discharge: Wheeled walker (Possibly need WW upon d/c)  PT Recommended Transfer Status: Stand by assist  PT - OK to Discharge: Yes (per POC)      General Visit Information:   PT  Visit  PT Received On: 08/08/24  General  Reason for Referral: Lateral L2-3 Discectomy and fusion, L2-3 Decompression, L2-4 Revision Fusion  Referred By: Louise Hernandez PA-C  Past Medical History Relevant to Rehab:   Past Medical History:   Diagnosis Date    CAD (coronary artery disease)     followed by cardiologist Dr. Uday saenz in [place], task made for notes, testing and per patient she was cleared from a cardiac standpoint    Chronic low back pain     Depression with anxiety     per patient\"thats why I'm on Lamictal\"    DM (diabetes mellitus) (Multi)     no recent A1c, task made for PCP note and recent labs from Dr. Jalil Mckinney in [place]    HLD (hyperlipidemia)     HTN (hypertension)     Hypothyroidism     Neuropathy     per patient constant tingling in both feet; Right leg is painful to touch and tingling    OA (osteoarthritis)     Post-laminectomy syndrome     Ringing in ears, bilateral     constant    Short-term memory loss     Spinal " stenosis of lumbar region with neurogenic claudication     Spondylolisthesis        Prior to Session Communication: Bedside nurse  Patient Position Received: Bed, 3 rail up, Alarm on    Subjective   Precautions:  Precautions  Medical Precautions: Fall precautions  Post-Surgical Precautions: Spinal precautions (lumbar)       Objective   Pain:  Pain Assessment  Pain Assessment: 0-10  0-10 (Numeric) Pain Score: 0 - No pain (at rest. Pt reports 8/10 with movement)  Cognition:  Cognition  Overall Cognitive Status: Within Functional Limits       Postural Control:  Static Sitting Balance  Static Sitting-Balance Support: Feet supported, Bilateral upper extremity supported  Static Sitting-Level of Assistance: Distant supervision  Static Sitting-Comment/Number of Minutes: Pt able to tolerate for extended periods of time  Static Standing Balance  Static Standing-Balance Support: Bilateral upper extremity supported  Static Standing-Level of Assistance: Distant supervision  Static Standing-Comment/Number of Minutes: Pt able to perform for extended periods of time.     Treatments:        Bed Mobility  Bed Mobility: Yes  Bed Mobility 1  Bed Mobility 1: Supine to sitting, Log roll  Level of Assistance 1: Close supervision  Bed Mobility Comments 1: HOB slightly elevated. Pt requires mod VC to perform log roll technique to maintain spinal precautions. Pt uses bed rail to roll and to elevate trunk.    Ambulation/Gait Training  Ambulation/Gait Training Performed: Yes  Ambulation/Gait Training 1  Surface 1: Level tile  Device 1: Rolling walker  Gait Support Devices: Gait belt  Assistance 1: Distant supervision  Quality of Gait 1: Decreased step length, Antalgic  Comments/Distance (ft) 1: 475 (Mod VC to remain within WW, poor carry over. Pt declines to attempt to ambulate without AD. Education provided on WW use vs no AD.)  Transfers  Transfer: Yes  Transfer 1  Technique 1: Sit to stand, Stand to sit  Transfer Device 1: Walker, Gait  "belt  Transfer Level of Assistance 1: Distant supervision  Trials/Comments 1: Pt performs 2 trials. Mod VC for awareness of lines. Mod VC for correct hand placement. No overt LOB noted.    Stairs  Stairs: No (pt declined, stating \"I don't have stairs at home\")    Outcome Measures:  [facility] Basic Mobility  Turning from your back to your side while in a flat bed without using bedrails: A little  Moving from lying on your back to sitting on the side of a flat bed without using bedrails: A little  Moving to and from bed to chair (including a wheelchair): A little  Standing up from a chair using your arms (e.g. wheelchair or bedside chair): A little  To walk in hospital room: A little  Climbing 3-5 steps with railing: A little  Basic Mobility - Total Score: 18    Education Documentation  No documentation found.  Education Comments  No comments found.        Encounter Problems       Encounter Problems (Active)       Balance       complete all mobility with normal balance while dual tasking, negotiating in a dynamic environment, carrying items, etc., with proactive and reactive static and dynamic standing and sitting tasks independently >15 minutes. (Progressing)       Start:  08/07/24    Expected End:  08/21/24               Mobility       STG - Patient will ambulate 450 ft independently with no assistive device.  (Progressing)       Start:  08/07/24    Expected End:  08/21/24            Pt will ascend/descend 3 stairs mod I using unilateral HR.  (Not Progressing)       Start:  08/07/24    Expected End:  08/21/24               PT Transfers       STG - Patient will perform bed mobility independently using log roll technique.  (Progressing)       Start:  08/07/24    Expected End:  08/21/24            STG - Patient will transfer sit to and from stand independently.  (Progressing)       Start:  08/07/24    Expected End:  08/21/24               Pain - Adult              "

## 2024-08-09 ENCOUNTER — HOME HEALTH ADMISSION (OUTPATIENT)
Dept: HOME HEALTH SERVICES | Facility: HOME HEALTH | Age: 74
End: 2024-08-09
Payer: MEDICARE

## 2024-08-09 VITALS
DIASTOLIC BLOOD PRESSURE: 72 MMHG | OXYGEN SATURATION: 97 % | RESPIRATION RATE: 18 BRPM | WEIGHT: 130.29 LBS | HEIGHT: 63 IN | TEMPERATURE: 98.6 F | HEART RATE: 79 BPM | SYSTOLIC BLOOD PRESSURE: 150 MMHG | BODY MASS INDEX: 23.09 KG/M2

## 2024-08-09 LAB — GLUCOSE BLD MANUAL STRIP-MCNC: 125 MG/DL (ref 74–99)

## 2024-08-09 PROCEDURE — 9420000001 HC RT PATIENT EDUCATION 5 MIN

## 2024-08-09 PROCEDURE — 2500000001 HC RX 250 WO HCPCS SELF ADMINISTERED DRUGS (ALT 637 FOR MEDICARE OP): Performed by: ORTHOPAEDIC SURGERY

## 2024-08-09 PROCEDURE — 2500000002 HC RX 250 W HCPCS SELF ADMINISTERED DRUGS (ALT 637 FOR MEDICARE OP, ALT 636 FOR OP/ED): Performed by: PHYSICIAN ASSISTANT

## 2024-08-09 PROCEDURE — 97535 SELF CARE MNGMENT TRAINING: CPT | Mod: GO,CO

## 2024-08-09 PROCEDURE — 97530 THERAPEUTIC ACTIVITIES: CPT | Mod: GP,CQ

## 2024-08-09 PROCEDURE — 82947 ASSAY GLUCOSE BLOOD QUANT: CPT

## 2024-08-09 PROCEDURE — 2500000001 HC RX 250 WO HCPCS SELF ADMINISTERED DRUGS (ALT 637 FOR MEDICARE OP): Performed by: PHYSICIAN ASSISTANT

## 2024-08-09 PROCEDURE — 97116 GAIT TRAINING THERAPY: CPT | Mod: GP,CQ

## 2024-08-09 RX ORDER — BISACODYL 10 MG/1
10 SUPPOSITORY RECTAL ONCE
Status: COMPLETED | OUTPATIENT
Start: 2024-08-09 | End: 2024-08-09

## 2024-08-09 RX ORDER — OXYCODONE HYDROCHLORIDE 5 MG/1
5 TABLET ORAL EVERY 4 HOURS PRN
Qty: 28 TABLET | Refills: 0 | Status: SHIPPED | OUTPATIENT
Start: 2024-08-09 | End: 2024-08-16

## 2024-08-09 ASSESSMENT — COGNITIVE AND FUNCTIONAL STATUS - GENERAL
CLIMB 3 TO 5 STEPS WITH RAILING: A LITTLE
DRESSING REGULAR UPPER BODY CLOTHING: A LITTLE
MOBILITY SCORE: 18
HELP NEEDED FOR BATHING: A LITTLE
TURNING FROM BACK TO SIDE WHILE IN FLAT BAD: A LITTLE
DRESSING REGULAR LOWER BODY CLOTHING: A LITTLE
WALKING IN HOSPITAL ROOM: A LITTLE
DAILY ACTIVITIY SCORE: 20
PERSONAL GROOMING: A LITTLE
MOVING FROM LYING ON BACK TO SITTING ON SIDE OF FLAT BED WITH BEDRAILS: A LITTLE
MOVING TO AND FROM BED TO CHAIR: A LITTLE
STANDING UP FROM CHAIR USING ARMS: A LITTLE

## 2024-08-09 ASSESSMENT — PAIN SCALES - GENERAL
PAINLEVEL_OUTOF10: 0 - NO PAIN
PAINLEVEL_OUTOF10: 5 - MODERATE PAIN
PAINLEVEL_OUTOF10: 5 - MODERATE PAIN
PAINLEVEL_OUTOF10: 0 - NO PAIN

## 2024-08-09 ASSESSMENT — PAIN - FUNCTIONAL ASSESSMENT
PAIN_FUNCTIONAL_ASSESSMENT: 0-10

## 2024-08-09 ASSESSMENT — ACTIVITIES OF DAILY LIVING (ADL): HOME_MANAGEMENT_TIME_ENTRY: 8

## 2024-08-09 NOTE — PROGRESS NOTES
08/09/24 1400   Discharge Planning   Assistance Needed This TCC is covering REMOTELY for [facility] so all information obtained for patient was via telephone or chart review. Care coordination assessment can NOT be done at bedside as this TCC is not on site. Healthy at home referral placed for PCP to follow for HHC. Patient is still in need of HHC orders prior to DC. Physician and physician assistant are aware. No HHC orders have been placed currently.

## 2024-08-09 NOTE — NURSING NOTE
Interdisciplinary team present: NP, PT, NM, CC, SW, Orthopedic Coordinator, and bedside RN.  Pain - controlled  Nausea - none  Discharge barrier - hemovac  Discharge plan - Home  Discharge date/time - tomorrow per Dr. Evangelista

## 2024-08-09 NOTE — CARE PLAN
The patient's goals for the shift include      The clinical goals for the shift include patient safety    Over the shift, the patient did make progress toward the goals.

## 2024-08-09 NOTE — PROGRESS NOTES
08/09/24 0828   Discharge Planning   Living Arrangements Spouse/significant other   Support Systems Spouse/significant other;Friends/neighbors;Family members   Assistance Needed This TCC is covering REMOTELY for [facility] so all information obtained for patient was via telephone or chart review. Care coordination assessment can NOT be done at bedside as this TCC is not on site. Patient is A&O X3, on room air at baseline, is independent with ADLs, drives and works part time. Patient uses no DME at home. PT/OT recommended HHC for patient. Spoke with patient via telephone who is agreeable to HHC and [facility] is [facility]. Address listed on file for patient is [address]. Per patient she will be staying here in [place] for a month after DC and would like HHC to the address here in [place]. This TCC was instructed by patient to call her significant other for the address. Spoke with patient's significant other and verified address is: [address], [place] [address]. Guillaume Evangelista MD notified via secure chat of need for HHC orders prior to DC.   Type of Residence Private residence   Number of Stairs to Enter Residence 1   Number of Stairs Within Residence 0   Do you have animals or pets at home? Yes   Type of Animals or Pets dog   Who is requesting discharge planning? Provider   Home or Post Acute Services In home services   Type of Home Care Services Home PT;Home OT   Expected Discharge Disposition HH Services   Does the patient need discharge transport arranged? No   Patient Choice   Provider Choice list and CMS website (https://medicare.gov/care-compare#search) for post-acute Quality and Resource Measure Data were provided and reviewed with: Patient;Family   Patient / Family choosing to utilize agency / facility established prior to hospitalization No

## 2024-08-09 NOTE — PROGRESS NOTES
08/09/24 1447   Discharge Planning   Who is requesting discharge planning? Provider   Home or Post Acute Services None   Expected Discharge Disposition Home   Does the patient need discharge transport arranged? No     8/9/24 8846  Spoke with patient over phone and explained that Dr Evangelista does not usually like to send his patients home with home care. She is okay with going home with no home care.  Jessy Cervantes RN TCC

## 2024-08-09 NOTE — PROGRESS NOTES
Occupational Therapy    Occupational Therapy Treatment    Name: Chandni Crawford  MRN: [id number]  : 1950  Date: 24  Time Calculation  Start Time: 1108  Stop Time: 1116  Time Calculation (min): 8 min    Assessment:  Medical Staff Made Aware: Yes  End of Session Communication: Bedside nurse  End of Session Patient Position: Bed, 3 rail up, Alarm off, not on at start of session  Plan:  Treatment Interventions: ADL retraining, Functional transfer training, Endurance training, Patient/family training, Neuromuscular reeducation  OT Frequency: 3 times per week  OT Discharge Recommendations: Low intensity level of continued care  Equipment Recommended upon Discharge: Other (comment) (hip kit; shower chair)  OT Recommended Transfer Status: Stand by assist  OT - OK to Discharge: Yes (per POC)    Subjective   Previous Visit Info:  OT Last Visit  OT Received On: 24  General:  General  Reason for Referral: Lateral L2-3 Discectomy and fusion, L2-3 Decompression, L2-4 Revision Fusion  Family/Caregiver Present: No  Caregiver Feedback: Partner present  Prior to Session Communication: Bedside nurse  Patient Position Received: Bed, 3 rail up, Alarm off, not on at start of session  Preferred Learning Style: auditory, kinesthetic, verbal  General Comment: Pt agreeable to therapy with VC for encouragement to participate.  Precautions:  Medical Precautions: Fall precautions  Post-Surgical Precautions: Spinal precautions  Precautions Comment: Pt able to recall all spinal precautions.       Pain Assessment:  Pain Assessment  Pain Assessment: 0-10  0-10 (Numeric) Pain Score: 5 - Moderate pain  Pain Type: Surgical pain  Pain Location: Back     Objective   Cognition:  Orientation Level: Oriented X4  Activities of Daily Living: Grooming  Grooming Level of Assistance: Distant supervision  Grooming Where Assessed: Standing sinkside  Grooming Comments: pt completed hand washing at sink    LE Dressing  LE Dressing: Yes  LE  Dressing Adaptive Equipment: Other (Comment) (pt declined to use)  Sock Level of Assistance: Close supervision  LE Dressing Where Assessed: Bed level  LE Dressing Comments: pt donned/doffed socks with figure four dressing technique    Toileting  Toileting Level of Assistance: Distant supervision  Where Assessed: Toilet  Toileting Comments: pt complete with VC for compensatory technique    Functional Standing Tolerance:     Bed Mobility/Transfers: Bed Mobility  Bed Mobility: Yes  Bed Mobility 1  Bed Mobility 1: Supine to sitting, Sitting to supine, Log roll  Level of Assistance 1: Close supervision  Bed Mobility Comments 1: VC for log rolling technique with HOB minimally elevated    Transfers  Transfer: Yes  Transfer 1  Transfer From 1: Bed to  Transfer to 1: Toilet  Technique 1: Sit to stand, Stand to sit  Transfer Device 1: Walker  Transfer Level of Assistance 1: Close supervision  Trials/Comments 1: No LOB noted    Outcome Measures:  [facility] Daily Activity  Putting on and taking off regular lower body clothing: A little  Bathing (including washing, rinsing, drying): A little  Putting on and taking off regular upper body clothing: A little  Toileting, which includes using toilet, bedpan or urinal: None  Taking care of personal grooming such as brushing teeth: A little  Eating Meals: None  Daily Activity - Total Score: 20    Education Documentation  ADL Training, taught by ANGELES Zavala at 8/9/2024 11:27 AM.  Learner: Patient  Readiness: Acceptance  Method: Explanation  Response: Verbalizes Understanding, Demonstrated Understanding    ADL Training, taught by ANGELES Zavala at 8/8/2024  1:59 PM.  Learner: Patient  Readiness: Acceptance  Method: Explanation, Demonstration  Response: Verbalizes Understanding, Demonstrated Understanding    Education Comments  No comments found.      Goals:  Encounter Problems       Encounter Problems (Active)       ADLs       Patient will perform UB and LB bathing with  independent level of assistance.  (Not Progressing)       Start:  08/07/24    Expected End:  08/21/24            Patient with complete upper body dressing with independent level of assistance donning and doffing all UE clothes with no adaptive equipment while edge of bed  (Not Progressing)       Start:  08/07/24    Expected End:  08/21/24            Patient with complete lower body dressing with independent level of assistance donning and doffing all LE clothes  with adaptive equipment while edge of bed  (Progressing)       Start:  08/07/24    Expected End:  08/21/24            Patient will complete daily grooming tasks brushing teeth and washing face/hair with independent level of assistance and PRN adaptive equipment while standing. (Progressing)       Start:  08/07/24    Expected End:  08/21/24            Patient will complete toileting including hygiene clothing management/hygiene with independent level of assistance and raised toilet seat. (Progressing)       Start:  08/07/24    Expected End:  08/21/24               BALANCE       Pt will maintain dynamic standing balance during ADL task with independent level of assistance in order to demonstrate decreased risk of falling and improved postural control. (Not Progressing)       Start:  08/07/24    Expected End:  08/21/24               TRANSFERS       Patient will perform bed mobility independent level of assistance and bed rails in order to improve safety and independence with mobility (Progressing)       Start:  08/07/24    Expected End:  08/21/24            Patient will complete functional transfer to all surfaces with front wheeled walker with modified independent level of assistance. (Progressing)       Start:  08/07/24    Expected End:  08/21/24

## 2024-08-09 NOTE — PROGRESS NOTES
Physical Therapy    Physical Therapy Treatment    Patient Name: Chandni Crawford  MRN: [id number]  Today's Date: 8/9/2024  Time Calculation  Start Time: 1008  Stop Time: 1032  Time Calculation (min): 24 min    Assessment/Plan   PT Assessment  PT Assessment Results: Decreased strength, Decreased range of motion, Decreased endurance, Impaired balance, Decreased mobility, Pain, Orthopedic restrictions  Rehab Prognosis: Good  Evaluation/Treatment Tolerance: Patient tolerated treatment well  Medical Staff Made Aware: Yes  End of Session Communication: Bedside nurse  Assessment Comment: Pt demonstrated good prigress toward goals.  Minor cues needed for  correct log roll technique. Safety awareness education reviewed today due to mild impusiveness at start of session.  End of Session Patient Position: Up in chair, Alarm on  PT Plan  Inpatient/Swing Bed or Outpatient: Inpatient  PT Plan  Treatment/Interventions: Bed mobility, Transfer training, Gait training, Stair training, Strengthening, Endurance training, Therapeutic activity  PT Plan: Ongoing PT  PT Frequency: Daily  PT Discharge Recommendations: Low intensity level of continued care  Equipment Recommended upon Discharge: Wheeled walker (Possibly need WW upon d/c)  PT Recommended Transfer Status: Stand by assist  PT - OK to Discharge: Yes (per PT POC)      General Visit Information:   PT  Visit  PT Received On: 08/09/24  General  Reason for Referral: Lateral L2-3 Discectomy and fusion, L2-3 Decompression, L2-4 Revision Fusion  Referred By: Louise Hernandez PA-C  Family/Caregiver Present: No  Prior to Session Communication: Bedside nurse  Patient Position Received: Bed, 3 rail up, Alarm off, caregiver present  Preferred Learning Style: auditory, kinesthetic, verbal  General Comment: Pt fully participatory.    Subjective   Precautions:  Precautions  Medical Precautions: Fall precautions  Post-Surgical Precautions: Spinal precautions  Precautions Comment: Pt able to  recall all spinal precautions.    Objective   Pain:  Pain Assessment  Pain Assessment: 0-10  0-10 (Numeric) Pain Score: 5 - Moderate pain  Pain Type: Surgical pain  Pain Location: Back  Cognition:  Cognition  Overall Cognitive Status: Within Functional Limits  Orientation Level: Oriented X4     Postural Control:  Postural Control  Postural Control: Within Functional Limits  Static Sitting Balance  Static Sitting-Balance Support: Feet supported, Bilateral upper extremity supported  Static Sitting-Level of Assistance: Distant supervision  Dynamic Sitting Balance  Dynamic Sitting-Balance Support: Feet supported, Bilateral upper extremity supported  Dynamic Sitting-Comments: Supervision  Static Standing Balance  Static Standing-Balance Support: Bilateral upper extremity supported  Static Standing-Level of Assistance: Distant supervision  Dynamic Standing Balance  Dynamic Standing-Balance Support: Left upper extremity supported  Dynamic Standing-Balance: Turning (ambulation and stairs.)  Dynamic Standing-Comments: Supervision.    Activity Tolerance:  Activity Tolerance  Endurance: Tolerates 30 min exercise with multiple rests  Treatments:       Bed Mobility  Bed Mobility: Yes  Bed Mobility 1  Bed Mobility 1: Log roll  Level of Assistance 1: Close supervision, Minimal verbal cues  Bed Mobility Comments 1: toward R side. HOB minimally elevated.    Ambulation/Gait Training  Ambulation/Gait Training Performed: Yes  Ambulation/Gait Training 1  Surface 1: Level tile  Device 1:  (IV pole - L UE for 225', and no AD for 250'.)  Gait Support Devices: Gait belt  Assistance 1: Distant supervision  Quality of Gait 1: Decreased step length, Antalgic  Comments/Distance (ft) 1: 450' total  Transfers  Transfer: Yes  Transfer 1  Technique 1: Sit to stand, Stand to sit  Transfer Device 1:  (no)  Transfer Level of Assistance 1: Close supervision  Trials/Comments 1: No LOB, mild instability  Transfers 2  Transfer From 2: Toilet to  Transfer  to 2: Toilet  Technique 2: Sit to stand, Stand to sit  Transfer Device 2:  (L wall grab bar)  Transfer Level of Assistance 2: Close supervision  Trials/Comments 2: 1 each.    Stairs  Stairs: Yes  Stairs  Rails 1: Left  Curb Step 1: No  Device 1: No device  Assistance 1: Close supervision  Comment/Number of Steps 1: x 3 stairs, mild instability with descending - no LOB.    Outcome Measures:  [facility] Basic Mobility  Turning from your back to your side while in a flat bed without using bedrails: A little  Moving from lying on your back to sitting on the side of a flat bed without using bedrails: A little  Moving to and from bed to chair (including a wheelchair): A little  Standing up from a chair using your arms (e.g. wheelchair or bedside chair): A little  To walk in hospital room: A little  Climbing 3-5 steps with railing: A little  Basic Mobility - Total Score: 18    Education Documentation  Handouts, taught by Bobbi Rodríguez PTA at 8/9/2024 10:51 AM.  Learner: Patient  Readiness: Acceptance  Method: Explanation, Demonstration  Response: Verbalizes Understanding, Demonstrated Understanding    Precautions, taught by Bobbi Rodríguez PTA at 8/9/2024 10:51 AM.  Learner: Patient  Readiness: Acceptance  Method: Explanation, Demonstration  Response: Verbalizes Understanding, Demonstrated Understanding    Body Mechanics, taught by Bobbi Rodríguez PTA at 8/9/2024 10:51 AM.  Learner: Patient  Readiness: Acceptance  Method: Explanation, Demonstration  Response: Verbalizes Understanding, Demonstrated Understanding    Mobility Training, taught by Bobbi Rodríguez PTA at 8/9/2024 10:51 AM.  Learner: Patient  Readiness: Acceptance  Method: Explanation, Demonstration  Response: Verbalizes Understanding, Demonstrated Understanding    Education Comments  No comments found.        OP EDUCATION:       Encounter Problems       Encounter Problems (Active)       Balance       complete all mobility with normal balance while dual tasking,  negotiating in a dynamic environment, carrying items, etc., with proactive and reactive static and dynamic standing and sitting tasks independently >15 minutes. (Progressing)       Start:  08/07/24    Expected End:  08/21/24               Mobility       STG - Patient will ambulate 450 ft independently with no assistive device.  (Progressing)       Start:  08/07/24    Expected End:  08/21/24            Pt will ascend/descend 3 stairs mod I using unilateral HR.  (Progressing)       Start:  08/07/24    Expected End:  08/21/24               PT Transfers       STG - Patient will perform bed mobility independently using log roll technique.  (Progressing)       Start:  08/07/24    Expected End:  08/21/24            STG - Patient will transfer sit to and from stand independently.  (Progressing)       Start:  08/07/24    Expected End:  08/21/24               Pain - Adult

## 2024-08-10 NOTE — DISCHARGE SUMMARY
Discharge Diagnosis  Spinal stenosis, lumbar region with neurogenic claudication    Issues Requiring Follow-Up       Test Results Pending At Discharge  Pending Labs       No current pending labs.            Hospital Course   This woman has had 2 prior surgeries done at another location.    She has developed significant adjacent level degenerative change with stenosis at L2-3.  Conservative measures of failed to relieve her symptoms.  Currently she presents for surgery in the form of a lateral interbody fusion at L2-3 and a revision posterior decompression and instrumented fusion at L2-3.  On the day of admission she underwent this procedure.  Postoperatively she did well.  Her radicular symptoms were improved.  She was neurologically intact.  She was mobilized with physical therapy.  Her Soliz catheter was removed on postoperative day 1.  She was voiding.  She moved her bowels on postoperative day 2.  On postoperative day 3 her drain was removed.  Her incisions were clean and dry.  She was neurologically intact.  She was discharged home with appropriate follow-up.    ** Dictated with voice recognition software and not immediately reviewed for errors in grammar and/or spelling **    Pertinent Physical Exam At Time of Discharge  Physical Exam    Home Medications     Medication List      START taking these medications     oxyCODONE 5 mg immediate release tablet; Commonly known as: Roxicodone;   Take 1 tablet (5 mg) by mouth every 4 hours if needed for moderate pain (4   - 6) for up to 7 days.     CONTINUE taking these medications     5-HTP ORAL   aspirin 81 mg EC tablet   atorvastatin 40 mg tablet; Commonly known as: Lipitor   BIOTIN ORAL   COLLAGEN SKIN RENEWAL ORAL   estradiol cypionate 5 mg/mL injection; Commonly known as: Depo-estradiol   gabapentin 300 mg capsule; Commonly known as: Neurontin   irbesartan-hydrochlorothiazide 300-12.5 mg tablet; Commonly known as:   Avalide   IRON GLYCINATE,POLYSACCH CMPLX ORAL    lamoTRIgine 200 mg tablet; Commonly known as: LaMICtal   levothyroxine 100 mcg tablet; Commonly known as: Synthroid, Levoxyl   metFORMIN 500 mg tablet; Commonly known as: Glucophage   progesterone 100 mg capsule; Commonly known as: Prometrium   thiamine 100 mg tablet; Commonly known as: Vitamin B-1   TRULICITY SUBQ   zolpidem 10 mg tablet; Commonly known as: Ambien     STOP taking these medications     chlorhexidine 0.12 % solution; Commonly known as: Peridex   co-enzyme Q-10 30 mg capsule   ibuprofen 800 mg tablet   omega-3 acid ethyl esters 1 gram capsule; Commonly known as: Lovaza   traMADol 50 mg tablet; Commonly known as: Ultram   TURMERIC ORAL   Vitamin D3 5,000 Units tablet; Generic drug: cholecalciferol       Outpatient Follow-Up  Future Appointments   Date Time Provider Department [place]   8/21/2024 10:40 AM MD DONTRELL [id number] [facility]   9/4/2024 11:40 AM MD DONTRELL [id number] [facility]       Guillaume Evangelista MD

## 2024-08-11 ENCOUNTER — PATIENT OUTREACH (OUTPATIENT)
Dept: HOME HEALTH SERVICES | Age: 74
End: 2024-08-11
Payer: MEDICARE

## 2024-08-11 NOTE — PROGRESS NOTES
Spoke with pt and lucian- they would like to speak with Dr. Evangelista as they were told Dr. Evangelista would be the one to sign off on [facility].     Patient's Address:   [address] [place] [id number]  **  If this is not the address patient will receive services - alert team and address in EMR**       Patient Contacts:  Extended Emergency Contact Information  Primary Emergency Contact: Shankar Yarbrough  Address: [address]           [place] [address] [facility]  Mobile Phone: [phone]  Relation: Partner                                Patient's Preferred Phone: [phone]  Patient's E-mail: [email or web address]

## 2024-08-12 ENCOUNTER — DOCUMENTATION (OUTPATIENT)
Dept: HOME HEALTH SERVICES | Facility: HOME HEALTH | Age: 74
End: 2024-08-12
Payer: MEDICARE

## 2024-08-12 NOTE — HH CARE COORDINATION
Home Care received a Referral for Physical Therapy and Occupational Therapy. We have processed the referral for a Start of Care on 8.13 or 8.14.2024.     If you have any questions or concerns, please feel free to contact us at [phone]. Follow the prompts, enter your five digit zip code, and you will be directed to your care team on [facility] 1.

## 2024-08-21 ENCOUNTER — APPOINTMENT (OUTPATIENT)
Dept: ORTHOPEDIC SURGERY | Facility: CLINIC | Age: 74
End: 2024-08-21
Payer: MEDICARE

## 2024-08-21 DIAGNOSIS — M48.061 DEGENERATIVE LUMBAR SPINAL STENOSIS: Primary | ICD-10-CM

## 2024-08-21 PROCEDURE — 1111F DSCHRG MED/CURRENT MED MERGE: CPT | Performed by: ORTHOPAEDIC SURGERY

## 2024-08-21 PROCEDURE — 1036F TOBACCO NON-USER: CPT | Performed by: ORTHOPAEDIC SURGERY

## 2024-08-21 PROCEDURE — 1159F MED LIST DOCD IN RCRD: CPT | Performed by: ORTHOPAEDIC SURGERY

## 2024-08-21 PROCEDURE — 3044F HG A1C LEVEL LT 7.0%: CPT | Performed by: ORTHOPAEDIC SURGERY

## 2024-08-21 PROCEDURE — 99024 POSTOP FOLLOW-UP VISIT: CPT | Performed by: ORTHOPAEDIC SURGERY

## 2024-08-21 RX ORDER — GABAPENTIN 300 MG/1
300 CAPSULE ORAL 3 TIMES DAILY
Status: SHIPPED | OUTPATIENT
Start: 2024-08-21 | End: 2024-09-18

## 2024-08-21 NOTE — PROGRESS NOTES
Chandni is over 2 weeks from surgery.  She is doing well.  She notes significant improvement in her radicular symptoms.  She does have some ongoing numbness in the right thigh.    She looks quite good.  Both incisions are healed.  Her strength is intact.    Stable course.    She has been on Neurontin regularly in the past for peripheral neuropathy.  We will increase her frequency to 600 mg 3 times a day.    We will see her back for her scheduled postoperative visit with plain film.    I do anticipate she can return home to [place] following that visit.

## 2024-08-21 NOTE — DOCUMENTATION CLARIFICATION NOTE
PATIENT:               SERENA CARUSO  ACCT #:                  [id number]  MRN:                       [id number]  :                       1950  ADMIT DATE:       2024 9:57 AM  DISCH DATE:        2024 4:58 PM  RESPONDING PROVIDER #:        [id number]          PROVIDER RESPONSE TEXT:    Low H/H not requiring treatment or evaluation    CDI QUERY TEXT:    Clarification    Instruction:    Based on your assessment of the patient and the clinical information, please provide the requested documentation by clicking on the appropriate radio button and enter any additional information if prompted.    Question: Is there a diagnosis indicative of the lab values    When answering this query, please exercise your independent professional judgment. The fact that a question is being asked, does not imply that any particular answer is desired or expected.    The patient's clinical indicators include:  Clinical Information: 74 y/o female presented with spinal stenosis, lumbar radiculopathy with neurogenic claudication. Underwent L2-L3 anterior fusion and L3-L4 posterior decompression and fusion.    Clinical Indicators: Surgical EBL 50mL.   Hgb 8.5 HCT 25.5   Hgb 7.1 HCT 22.5    Hemovac output  80mL,  320mL,  127mL,  30mL    Treatment: monitoring    Risk Factors: surgery, age, ASA daily  Options provided:  -- Acute blood loss anemia  is clinically significant and required treatment/monitoring  -- Low H/H not requiring treatment or evaluation  -- Other - I will add my own diagnosis  -- Refer to Clinical Documentation Reviewer    Query created by: Karen Vaz on 2024 12:20 PM      Electronically signed by:  MASSIEL DONOVAN MD 2024 8:22 AM

## 2024-09-04 ENCOUNTER — HOSPITAL ENCOUNTER (OUTPATIENT)
Dept: RADIOLOGY | Facility: CLINIC | Age: 74
Discharge: HOME | End: 2024-09-04
Payer: MEDICARE

## 2024-09-04 ENCOUNTER — APPOINTMENT (OUTPATIENT)
Dept: ORTHOPEDIC SURGERY | Facility: CLINIC | Age: 74
End: 2024-09-04
Payer: MEDICARE

## 2024-09-04 DIAGNOSIS — M48.061 DEGENERATIVE LUMBAR SPINAL STENOSIS: ICD-10-CM

## 2024-09-04 DIAGNOSIS — M48.061 DEGENERATIVE LUMBAR SPINAL STENOSIS: Primary | ICD-10-CM

## 2024-09-04 PROCEDURE — 99024 POSTOP FOLLOW-UP VISIT: CPT | Performed by: ORTHOPAEDIC SURGERY

## 2024-09-04 PROCEDURE — 3044F HG A1C LEVEL LT 7.0%: CPT | Performed by: ORTHOPAEDIC SURGERY

## 2024-09-04 PROCEDURE — 1159F MED LIST DOCD IN RCRD: CPT | Performed by: ORTHOPAEDIC SURGERY

## 2024-09-04 PROCEDURE — 72100 X-RAY EXAM L-S SPINE 2/3 VWS: CPT

## 2024-09-04 PROCEDURE — 1111F DSCHRG MED/CURRENT MED MERGE: CPT | Performed by: ORTHOPAEDIC SURGERY

## 2024-09-04 PROCEDURE — 1036F TOBACCO NON-USER: CPT | Performed by: ORTHOPAEDIC SURGERY

## 2024-09-04 ASSESSMENT — PAIN - FUNCTIONAL ASSESSMENT: PAIN_FUNCTIONAL_ASSESSMENT: NO/DENIES PAIN

## 2024-09-04 NOTE — PROGRESS NOTES
Chandni returns for her second postop visit and she is continuing to do extremely well.  Complete relief of her severe preoperative radicular symptoms.  Her left thigh weakness is gradually improving.    On exam she looks quite good.  Both incisions are healed.  Her strength is intact in both lower extremities.    X-rays show good position of her interbody device and her posterior instrumentation.    She is returning to [place] later this week.    We have given her a referral for therapy to work on conditioning and strengthening.    She we will plan to be back in [place] early next year and would like to see her at that time with repeat plain film.    She will keep us updated on her progress.    ** Dictated with voice recognition software and not immediately reviewed for errors in grammar and/or spelling **

## 2024-09-11 ENCOUNTER — APPOINTMENT (OUTPATIENT)
Dept: ORTHOPEDIC SURGERY | Facility: CLINIC | Age: 74
End: 2024-09-11
Payer: MEDICARE

## 2025-02-11 ENCOUNTER — OFFICE VISIT (OUTPATIENT)
Dept: ORTHOPEDIC SURGERY | Facility: CLINIC | Age: 75
End: 2025-02-11
Payer: MEDICARE

## 2025-02-11 ENCOUNTER — HOSPITAL ENCOUNTER (OUTPATIENT)
Dept: RADIOLOGY | Facility: CLINIC | Age: 75
Discharge: HOME | End: 2025-02-11
Payer: MEDICARE

## 2025-02-11 DIAGNOSIS — Z98.1 S/P LUMBAR FUSION: ICD-10-CM

## 2025-02-11 PROCEDURE — 72100 X-RAY EXAM L-S SPINE 2/3 VWS: CPT

## 2025-02-11 PROCEDURE — 1036F TOBACCO NON-USER: CPT | Performed by: ORTHOPAEDIC SURGERY

## 2025-02-11 PROCEDURE — 99213 OFFICE O/P EST LOW 20 MIN: CPT | Performed by: ORTHOPAEDIC SURGERY

## 2025-02-11 PROCEDURE — 72100 X-RAY EXAM L-S SPINE 2/3 VWS: CPT | Performed by: RADIOLOGY

## 2025-02-11 PROCEDURE — 1159F MED LIST DOCD IN RCRD: CPT | Performed by: ORTHOPAEDIC SURGERY

## 2025-02-12 NOTE — PROGRESS NOTES
Chandni  is 6 months from surgery.  She has returned to [place] from [place] to visit and follow-up.    She has done exceptionally well.  She has had excellent relief of her severe preoperative radicular symptoms.    On exam, her posture is upright.  Her gait is stable.  Her strength is intact.    X-rays show solid fusion.  She does have slight retrolisthesis of L1 on 2.    Ongoing good result.  She will continue activities as tolerated.  I have encouraged her to continue with an exercise program.    Follow-up as needed.

## 2025-07-18 PROCEDURE — 88305 TISSUE EXAM BY PATHOLOGIST: CPT

## 2025-07-18 PROCEDURE — 0753T DGTZ GLS MCRSCP SLD LEVEL IV: CPT

## 2025-07-18 PROCEDURE — 88305 TISSUE EXAM BY PATHOLOGIST: CPT | Performed by: PATHOLOGY

## 2025-07-22 ENCOUNTER — LAB REQUISITION (OUTPATIENT)
Dept: LAB | Facility: HOSPITAL | Age: 75
End: 2025-07-22
Payer: MEDICARE

## 2025-07-26 LAB
LABORATORY COMMENT REPORT: NORMAL
PATH REPORT.FINAL DX SPEC: NORMAL
PATH REPORT.GROSS SPEC: NORMAL
PATH REPORT.RELEVANT HX SPEC: NORMAL
PATH REPORT.TOTAL CANCER: NORMAL

## (undated) DEVICE — DRAPE, SHEET, THREE QUARTER, FAN FOLD, 57 X 77 IN

## (undated) DEVICE — DRAIN, WOUND, ROUND, W/TROCAR, HOLE PATTERN, 10 IN, MEDIUM/LARGE, 3/16 X 49 IN

## (undated) DEVICE — BONE, MILL, MIDAS REX, DUAL BLADE, ELECTRIC

## (undated) DEVICE — SUTURE, VICRYL, 4-0, 18 IN, PS2, UNDYED

## (undated) DEVICE — DRESSING, NON-ADHERENT, OIL EMULSION, CURITY, 3 X 8 IN, STERILE

## (undated) DEVICE — Device

## (undated) DEVICE — ADHESIVE, SKIN, MASTISOL, 2/3 CC VIAL

## (undated) DEVICE — SUTURE, ETHILON, 2-0, 18 IN, FS, BLACK, BX/12

## (undated) DEVICE — DRAPE, INCISE, ANTIMICROBIAL, IOBAN 2, LARGE, 17 X 23 IN, DISPOSABLE, STERILE

## (undated) DEVICE — ELECTRODE, ELECTROSURGICAL, BLADE, STANDARD, 2.75 IN

## (undated) DEVICE — KIT, PATIENT CARE, JACKSON TABLE W/PRONE-SAFE HEADREST

## (undated) DEVICE — TIP, SUCTION, FRAZIER, W/CONTROL VENT, 10 FR

## (undated) DEVICE — LEGEND, BALL FLUTED, 9 CM, 3 MM

## (undated) DEVICE — SUTURE, VICRYL, 2-0, 27 IN, FSL, UNDYED

## (undated) DEVICE — LEGEND, BALL FLUTED, 9 CM, 5MM

## (undated) DEVICE — SPHERE, STEALTHSTATION, 5-PK

## (undated) DEVICE — SPONGE, DISSECTOR, PEANUT, 3/8, STERILE 5 FOAM HOLDER"

## (undated) DEVICE — DRAPE, C-ARM, W/12 IN COVER, LI XTRAY TUBE

## (undated) DEVICE — GOWN, ASTOUND, XL

## (undated) DEVICE — ELECTRODE, ELECTROSURGICAL, BLADE, NONSTICK, MODIFIED, 6.5 IN X 165 MM

## (undated) DEVICE — GLOVE, SURGICAL, PROTEXIS PI BLUE W/NEUTHERA, 8.5, PF, LF

## (undated) DEVICE — PREP TRAY, VAGINAL

## (undated) DEVICE — SUTURE, VICRYL PLUS, 2-0, 27IN, PSL, UND, BRAIDED

## (undated) DEVICE — GLOVE, SURGICAL, PROTEXIS PI MICRO, 8.0, PF, LF

## (undated) DEVICE — TIP, SUCTION, FRAZIER, W/CONTROL VENT, 12 FR

## (undated) DEVICE — EVACUATOR, WOUND, CLOSED, 3 SPRING, 400 CC, Y CONNECTING TUBE

## (undated) DEVICE — FLOSEAL, MATRIX, HEMOSTATIC, FULL STERILE PREP, 5ML

## (undated) DEVICE — DRESSING, GAUZE, SUPER KERLIX, 6X6

## (undated) DEVICE — LEGEND, LUB DIFFUSER PACK

## (undated) DEVICE — SUTURE, ETHILON, 2-0, 18 IN, FS, BLACK, BX/36

## (undated) DEVICE — SUTURE, VICRYL, 1, 27 IN, CT-1, VIOLET